# Patient Record
Sex: FEMALE | Race: NATIVE HAWAIIAN OR OTHER PACIFIC ISLANDER | Employment: FULL TIME | URBAN - METROPOLITAN AREA
[De-identification: names, ages, dates, MRNs, and addresses within clinical notes are randomized per-mention and may not be internally consistent; named-entity substitution may affect disease eponyms.]

---

## 2017-04-19 ENCOUNTER — HOSPITAL ENCOUNTER (OUTPATIENT)
Dept: ULTRASOUND IMAGING | Age: 57
Discharge: HOME | End: 2017-04-19
Attending: HEALTH CARE PROVIDER
Payer: OTHER GOVERNMENT

## 2017-04-19 DIAGNOSIS — D25.0 SUBMUCOUS LEIOMYOMA OF UTERUS: ICD-10-CM

## 2017-04-19 PROCEDURE — 76830 TRANSVAGINAL US NON-OB: CPT

## 2017-08-01 ENCOUNTER — HOSPITAL ENCOUNTER (OUTPATIENT)
Dept: PHYSICAL THERAPY | Age: 57
Discharge: HOME | End: 2017-08-01
Payer: OTHER GOVERNMENT

## 2017-08-01 PROCEDURE — 97112 NEUROMUSCULAR REEDUCATION: CPT

## 2017-08-01 PROCEDURE — 97162 PT EVAL MOD COMPLEX 30 MIN: CPT

## 2017-08-01 PROCEDURE — 97140 MANUAL THERAPY 1/> REGIONS: CPT

## 2017-08-01 NOTE — PROGRESS NOTES
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 \"    SUBJECTIVE  Chief Complaint:    Mechanism of injury:    Symptoms:  Pain rating (0-10): Today:    Best:    Worst:    [] Contstant:    [] Intermittent:     Aggravated by:   [] Bending [] Sitting [] Standing [] Walking   [] Moving [] Cough [] Sneeze [] Valsalva   [] AM  [] PM  Lying:  [] sup   [] pro   [] sidelying   [] Other:     Eased by:    [] Bending [] Sitting [] Standing [] Walking   [] Moving [] AM  [] PM  Lying: [] sup  [] pro  [] sidelying   [] Other:     General Health:  Red Flags Indicated? [] Yes    [] No  [] Yes [] No Recent weight change (If yes, due to dieting?  [] Yes  [] No)   [] Yes [] No Weakness in legs during walking  [] Yes [] No Unremitting pain at night  [] Yes [] No Abdominal pain or problems  [] Yes [] No Rectal bleeding  [] Yes [] No Feet more cold or painful in cold weather  [] Yes [] No Menstrual irregularities  [] Yes [] No Blood or pain with urination  [] Yes [] No Dysfunction of bowel or bladder  [] Yes [] No Recent illness within past 3 weeks (i.e, cold, flu)  [] Yes [] No Numbness/tingling in buttock/genitalia region    Past History/Treatments:     Diagnostic Tests: [] Lab work [] X-rays    [] CT [] MRI     [] Other:  Results:    Functional Status  Prior level of function:  Present functional limitations:  What position do you sleep in?:    OBJECTIVE  Posture:  Lateral Shift: [] R    [] L     [] +  [] -  Kyphosis: [] Increased [] Decreased   []  WNL  Lordosis:  [] Increased [] Decreased   [] WNL  Pelvic symmetry: [] WNL    [] Other:    Gait:  [] Normal     [] Abnormal:    Active Movements: [] N/A   [] Too acute   [] Other:  ROM % AROM % PROM Comments:pain, area   Forward flexion 40-60      Extension 20-30      SB right 20-30      SB left 20-30      Rotation right 5-10      Rotation left 5-10        Repeated Movements   Effects on present pain: produces (CO), abolishes (A), increases (incr), decreases (decr), centralizes (C), peripheral (PH), no effect (NE)   Pre-Test Sx Flexion Repeated Flexion Extension Repeated Extension Repeated SBL Repeated SBR   Sitting          Standing          Lying      N/A N/A   Comments:  Side Glide:  Sustained passive positioning test:    Neuro Screen [] WNL  Myotome/Dermatome/Reflexes:  Comments:    Dural Mobility:  SLR Sitting: [] R    [] L    [] +    [] -  @ (degrees):           Supine: [] R    [] L    [] +    [] -  @ (degrees):   Slump Test: [] R    [] L    [] +    [] -  @ (degrees):   Prone Knee Bend: [] R    [] L    [] +    [] -     Palpation  [] Min  [] Mod  [] Severe    Location:  [] Min  [] Mod  [] Severe    Location:  [] Min  [] Mod  [] Severe    Location:    Stabilization Tests  Multifidus Test  Level 1: Prone abdominal draw in (Goal 6-10mmHG):  Level 2: Supported leg load supine (needle deflection at 40mmHG): [] Yes  [] No   Level 3: Unsupported leg load supine (needle deflection at 40mmHG): [] Yes  [] No     Strength   L(0-5) R (0-5) N/T   Hip Flexion (L1,2)   []   Knee Extension (L3,4)   []   Ankle Dorsiflexion (L4)   []   Great Toe Extension (L5)   []   Ankle Plantarflexion (S1)   []   Knee Flexion (S1,2)   []   Upper Abdominals   []   Lower Abdominals   []   Paraspinals   []   Back Rotators   []   Gluteus Richard   []   Other   []     Special Tests  Lumbar:  Lumb.  Compression: [] Pos  [] Neg               Lumbar Distraction:   [] Pos  [] Neg    Quadrant:  [] Pos  [] Neg   [] Flex  [] Ext    Sacroilliac:  Gaenslen's: [] R    [] L    [] +    [] -     Compression: [] +    [] -     Gapping:  [] + [] -     Thigh Thrust: [] R    [] L    [] +    [] -     Leg Length: [] +    [] -   Position:    Crests:    ASIS:    PSIS:    Sacral Sulcus:    Mobility: Standing flex:     Sitting flex:     Supine to sit:     Prone knee bend:         Hip: Brittni Dawley:  [] R    [] L    [] +    [] -     Scour:  [] R    [] L    [] +    [] -     Piriformis: [] R    [] L    [] +    [] -          Deficits: Cynthia's: [] R    [] L    [] +    [] -     Toby Wayne: [] R    [] L    [] +    [] -     Hamstrings 90/90:    Gastrocsoleus (to neutral): Right: Left:       Global Muscular Weakness:  Abdominals:  Quadratus Lumborum:  Paraspinals: Other:    Other tests/comments:        Physical Therapy Evaluation Cervical Spine - [facility]    SUBJECTIVE  Chief Complaint:    Mechanism of injury:    Symptoms  Pain rating (0-10): Today:    Best:    Worst:    [] Contstant:    [] Intermittent:     Aggravated by:   [] Bending [] Sitting [] Standing [] Reaching Overhead   [] Moving [] Cough [] Sneeze [] Eating   [] AM  [] PM  Lying:  [] sup   [] pro   [] sidelying   [] Other:     Eased by:    [] Bending [] Sitting [] Standing Lying: [] sup  [] pro  [] sidelying   [] Moving [] AM  [] PM  [] Other:     General Health:  Red Flags Indicated? [] Yes    [] No  [] Yes [] No Recent weight change (If yes, due to dieting? [] Yes  [] No)   [] Yes [] No Persistent cough  [] Yes [] No Unremitting pain at night  [] Yes [] No Dizziness  [] Yes [] No Blurred vision  [] Yes [] No Hands more cold or painful in cold weather  [] Yes [] No Ringing in ears  [] Yes [] No Difficulty swallowing  [] Yes [] No Dysfunction of bowel or bladder  [] Yes [] No Recent illness within past 3 weeks (i.e, cold, flu)  [] Yes [] No Jaw pain    Past History/Treatments:    Diagnostic Tests: [] Lab work [] X-rays    [] CT [] MRI     [] Other:  Results:    Functional Status  Prior level of function:  Present functional limitations:  What position do you sleep in?:    Headaches: Do you have headaches?  [] Yes [] No  How often do you get headaches? How long does the headache last?  What aggravates it? What relieves it? Does the headache coincide with any other symptoms (visual disturbances, light sensitivity)? Where is the headache? Does it change locations?   Other:    OBJECTIVE  Posture: [] WNL  Head Position: mild left side bend of neck  Shoulder/Scapular Position:  C-Kyphosis:  [] increased   [] decreased   C-Lordosis:   [] increased   [] decreased  T-Kyphosis:  [] increased   [] decreased  T-Lordosis:   [] increased   [] decreased     TMJ: [] N/A [] Abnormal - ROM:   Palpation:    Cervical Retraction: [] WNL    [] Abnormal:    Shoulder/Scapular Screen: [] WNL    [] Abnormal:    Active Movements: [] N/A   [] Too acute   [] Other:  ROM % AROM % PROM Comments:pain, area   Forward flexion 45     Extension 39     SB right 30 100    SB left  44 100    Rotation right 64 100    Rotation left 66 100      Thoracic Spine: [] N/A    [] WNL   [] Other:    PROM:    Palpation:  [] Min  [] Mod  [] Severe    Location:  [] Min  [] Mod  [] Severe    Location:  [] Min  [] Mod  [] Severe    Location:    Neuro Screen (myotome/dematome/felexes): [] WNL  Myotome Level Muscle Test Myotome Level Muscle Test   C5 Shoulder Adduction - Deltoid C8 Finger Flexors   C6 Wrist Extension T1 Finger Abduction - Interossei   C7 Elbow Extension     Comments:  Upper Limb Tension Tests: [] N/A       Ulnar: [] R    [] L    [] +    [] -       Median: [] R    [] L    [] +    [] -       Radial: [] R    [] L    [] +    [] -    Special Tests:  Cervical:        Vertebral Artery:  [] R    [] L    [] +    [] -       Alar Ligament: [] R    [] L    [] +    [] -       Transverse Lig: [] R    [] L    [] +    [] -       Spurling's:  [] R    [] L    [] +    [] -       Distraction:  [] R    [] L    [] +    [] -       Compression: [] R    [] L    [] +    [] -    Thoracic Outlet Tests: [] N/A       Adson's:  [] R    [] L    [] +    [] -       Hyperabduction: [] R    [] L    [] +    [] -       Pramod's:  [] R    [] L    [] +    [] -       Cris Pollard:  [] R    [] L    [] +    [] -    Diaphragmatic Breathing: [] Normal    [] Abnormal    Muscle Flexibility: [] N/A   Scalenes: [] WNL    [x] Tight    [x] R    [x] L   Upper Trap: [] WNL    [x] Tight    [x] R    [x] L   Levator: [] WNL    [x] Tight    [x] R    [x] L   Pect. Minor: [] WNL    [] Tight    [] R    [] L    Global Muscular Weakness: [] N/A   Lower Trap:   Rhomboids:   Middle Trap:   Serratus Ant:   Ext Rotators:    Other:    Other tests/comments:  right LE shorter than left in supine and sitting- right innominate upslip  C2 segment rotated left resulting in mild left C-spine sidebend  Segmental mobility in side gliding mildly hypo mobile  Segmental mobility in sagittal palne moderate-significantly hypomobile in upper T-spine/lower C-spine segments  Trigger points B UT, levator scapula, B anterior scalenes, B SCM, B neck paraspinals and B neck multifidi  AROM shoulder flexion: 152 degrees right, 165 degrees left  Abduction: 175 degrees right with pain, 180 degrees left  Shoulder extension: 50 degrees right, 65 degrees left  Scaption: 169 degrees right, 180 degrees left  ER: C7 right, T1 left  IR: T12 right, T11 left  Positive Joleenkin-Sean and Neterrie  1+ TTP right subacromial space  0 TTP right ACJ  PROM shoulder flexion: 155 degrees right, 180 degrees left  PROM right shoulder: all other directions WNL on right

## 2017-08-01 NOTE — PROGRESS NOTES
PT DAILY TREATMENT NOTE     Patient Name: Brandy Tracy  Date:2017  : 1960  [x]  Patient  Verified  Payor: [facility] / Plan: [facility]  RETIREES AND DEPENDENTS / Product Type: [employer] /    In time:4:45  Out time:5:40  Total Treatment Time (min): 54  Visit #: 1 of 16    Treatment Area: Low back pain [M54.5]  Cervicalgia [M54.2]    SUBJECTIVE  Pain Level (0-10 scale): 8/10 back, 4-5/10 neck  Any medication changes, allergies to medications, adverse drug reactions, diagnosis change, or new procedure performed?: [x] No    [] Yes (see summary sheet for update)  Subjective functional status/changes:   [] No changes reported  LBP right- insidious onset over 10 years ago  3-4 weeks ago lifting bag of laundry and had left anterolateral neck pain  C/o right LBP and right posterolateral trunk and right posterior LE pain to the ankle when standing longer than 3 hours, left richard lateral neck, denies UE radicular symptoms, weakness B hands (OA)   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 \"    OBJECTIVE    Modality rationale:    Min Type Additional Details    [] Estim:  []Unatt       []IFC  []Premod                        []Other:  []w/ice   []w/heat  Position:  Location:    [] Estim: []Att    []TENS instruct  []NMES                    []Other:  []w/US   []w/ice   []w/heat  Position:  Location:    []  Traction: [] Cervical       []Lumbar                       [] Prone          []Supine                       []Intermittent   []Continuous Lbs:  [] before manual  [] after manual    []  Ultrasound: []Continuous   [] Pulsed                           []1MHz   []3MHz W/cm2:  Location:    []  Iontophoresis with dexamethasone         Location: [] Take home patch   [] In clinic    []  Ice     []  heat  []  Ice massage  []  Laser   []  Anodyne Position:  Location:    []  Laser with stim  []  Other:  Position:  Location:    []  Vasopneumatic Device Pressure:       [] lo [] med [] hi   Temperature: [] lo [] med [] hi   [] Skin assessment post-treatment:  []intact []redness- no adverse reaction    []redness - adverse reaction:     25 min [x]Eval                  []Re-Eval        min Therapeutic Exercise:  [] See flow sheet :        min Therapeutic Activity:  []  See flow sheet :        10 min Neuromuscular Re-education:  [x]  See flow sheet :  Added prone heel presses, glutes sets, bridges with adduction   Rationale: increase strength, improve coordination and increase proprioception  to improve the patients ability to tolerate positions and normalize ADLs. 20 min Manual Therapy:    METs to correct right innominate upslip, trigger point release B upper neck paraspinals, B SCM, B levator scapula   Rationale: decrease pain and correct joint alignment and joint mechanics to tolerate positions and ADLs. min Gait Training:  ___ feet with ___ device on level surfaces with ___ level of assist   Rationale: With   [] TE   [] TA   [] neuro   [] other: Patient Education: [x] Review HEP    [] Progressed/Changed HEP based on:   [] positioning   [] body mechanics   [] transfers   [] heat/ice application    [] other:      Other Objective/Functional Measures:   See evaluation and plan of care. Pain Level (0-10 scale) post treatment: 0/10    ASSESSMENT/Changes in Function:   Pt reports chronic constant right LBP with intermittent right LE pain to the ankle and intermittent left neck pain that started when she lifted a bag of laundry 3-4 weeks ago.   During exam, pt displayed a right innominate upslip (corrected today resulting in immediate abolishment of right LBP); AROM of the neck limited (AROM neck rotation: 66 degrees left, 64 degrees right, lateral flexion: 30 degrees right, 44 degrees, forward flexion: 45 degrees, neck extension: 39 degrees); moderate-significantly hypomobile segmental mobility in upper T-spine/lower C-spine segments in sagittal plane; decreased AROM of the right shoulder in all directions and decreased PROM shoulder flexion only (155 degrees right); positive shoulder impingement signs right shoulder; multiple trigger points in the bilateral neck and C/T musculature. Patient will continue to benefit from skilled PT services to modify and progress therapeutic interventions, address ROM deficits, address strength deficits, analyze and address soft tissue restrictions, analyze and cue movement patterns, analyze and modify body mechanics/ergonomics, assess and modify postural abnormalities and instruct in home and community integration to attain remaining goals. []  See Plan of Care  []  See progress note/recertification  []  See Discharge Summary         Progress towards goals / Updated goals:  Short Term Goals: To be accomplished in 4 weeks:    1) Goal: Pt's LBP will decrease to 4/10 at worst in order to better tolerate all ADLs. Status at initial evaluation: LBP 8/10 at worst  Current Status: not reassessed     2) Goal: Innominate alignment will remain symmetric and stable in order to decrease pain and improve tolerance for ambulation and ADLs. Status at initial evaluation: right innominate upslip corrected on 8/01/17  Current Status: not reassessed     3) Goal: Right LE radicular symptoms will be centralized to the low back in order to demonstrate effectiveness of directional preference exercises and decrease risk of LE dysfunction. Status at initial evaluation: intermittent right LE pain to the ankle  Current Status: not reassessed     4) Goal: Pt will be independent and compliant with HEP to achieve other goals. Status at initial evaluation: pt is nt independent with exercises. Current Status: not reassessed    Long Term Goals (To be completed in 8 weeks)   1) Goal: Increase AROM/PROM of right shoulder to 100% of normal in all directions without increased pain in order to return to normal function.   Status at initial evaluation:  Current status: not reassessed     2) Goal: Increase strength of right UE to 5/5 all muscle groups without increased pain in order to return to normal function. Status at initial evaluation: not assessed  Current status: not reassessed     3) Goal: Decrease pain in the neck to 2/10 at worst during ADLs in order to return to more function and improve AROM of neck in all directions. Status at initial evaluation: pain   Current status: not reassessed     4) Goal: Pt will improve AROM of neck to 100% of normal range in each direction without increased pain in order to return to safe driving. Status at initial evaluation: AROM neck rotation: 66 degrees left, 64 degrees right, lateral flexion: 30 degrees right, 44 degrees, forward flexion: 45 degrees, neck extension: 39 degrees  Current status: not reassessed     5) Goal: Abolish trigger points in the bilateral C/T and neck musculature in order to decrease pain, improve pain-free AROM of the neck in all directions, and allow pt to push, pull, and lift up to 30 lbs. without increased pain. Status at initial evaluation: multiple trigger points in the bilateral neck and C/T musculature  Current status: not reassessed     6) Goal: Abolish LBP and radicular symptoms in order to return to normal function. Status at initial evaluation: right LBP: 8/10 at worst with >r LE pain to the ankle  Current Status: not reassessed     7) Goal: Pt will be able to demonstrate ability to self manage LBP to 1/10 at worst during all activities in order to return to normal function.   Status at initial evaluation: LBP 8/10 at worst  Current Status: not reassessed     PLAN  []  Upgrade activities as tolerated     []  Continue plan of care  []  Update interventions per flow sheet       []  Discharge due to:_  [x]  Other: METs, back/core/innominate stabilization, shoulder strengthening/stabilization, shoulder ROM/stretches, trigger point release, traction, spine mobilizations      Rakan Heaton V, PT 8/1/2017  5:07 PM    No future appointments.

## 2017-08-01 NOTE — PROGRESS NOTES
[facility] in [address] [place], [address] [place] [address]  Phone: [phone] Fax: [phone] of Care/ Statement of Necessity for Physical Therapy Services    Patient name: Brittany Rankin Start of Care: 2017   Referral source: Domonique Christine MD : 1960    Medical Diagnosis: Low back pain [M54.5]  Cervicalgia [M54.2] Onset Date: over 10 years ago (LBP), early 2017 (neck)    Treatment Diagnosis: decreased AROM, pelvis obliquity/instability, muscular dysfunction, decreased strength/stability, decreased strength, shoulder impingement   Prior Hospitalization: see medical history Provider#: [id number]   Medications: Verified on Patient summary List    Comorbidities: OA, HTN   Prior Level of Function: no deficits working in [employer] and following information is based on the information from the initial evaluation. Assessment/ key information:    Pt reports chronic constant right LBP with intermittent right LE pain to the ankle and intermittent left neck pain that started when she lifted a bag of laundry 3-4 weeks ago. During exam, pt displayed a right innominate upslip (corrected today resulting in immediate abolishment of right LBP); AROM of the neck limited (AROM neck rotation: 66 degrees left, 64 degrees right, lateral flexion: 30 degrees right, 44 degrees, forward flexion: 45 degrees, neck extension: 39 degrees); moderate-significantly hypomobile segmental mobility in upper T-spine/lower C-spine segments in sagittal plane; decreased AROM of the right shoulder in all directions and decreased PROM shoulder flexion only (155 degrees right); positive shoulder impingement signs right shoulder; multiple trigger points in the bilateral neck and C/T musculature.     Evaluation Complexity History MEDIUM  Complexity : 1-2 comorbidities / personal factors will impact the outcome/ POC ; Examination HIGH Complexity : 4+ Standardized tests and measures addressing body structure, function, activity limitation and / or participation in recreation  ;Presentation LOW Complexity : Stable, uncomplicated  ;Clinical Decision Making MEDIUM Complexity : FOTO score of 26-74  Overall Complexity Rating: MEDIUM  Problem List: pain affecting function, decrease ROM, decrease strength, decrease ADL/ functional abilitiies, decrease activity tolerance and decrease flexibility/ joint mobility   Treatment Plan may include any combination of the following: Therapeutic exercise, Therapeutic activities, Neuromuscular re-education, Physical agent/modality, Manual therapy and Patient education  Patient / Family readiness to learn indicated by: asking questions, trying to perform skills and interest  Persons(s) to be included in education: patient (P)  Barriers to Learning/Limitations: None  Patient Goal (s): \"to get rid of the pain. \"  Patient Self Reported Health Status: good  Rehabilitation Potential: good    Short Term Goals: To be accomplished in 4 weeks:    1) Goal: Pt's LBP will decrease to 4/10 at worst in order to better tolerate all ADLs. Status at initial evaluation: LBP 8/10 at worst   2) Goal: Innominate alignment will remain symmetric and stable in order to decrease pain and improve tolerance for ambulation and ADLs. Status at initial evaluation: right innominate upslip corrected on 8/01/17   3) Goal: Right LE radicular symptoms will be centralized to the low back in order to demonstrate effectiveness of directional preference exercises and decrease risk of LE dysfunction. Status at initial evaluation: intermittent right LE pain to the ankle   4) Goal: Pt will be independent and compliant with HEP to achieve other goals. Status at initial evaluation: pt is nt independent with exercises.     Long Term Goals (To be completed in 8 weeks)   1) Goal: Increase AROM/PROM of right shoulder to 100% of normal in all directions without increased pain in order to return to normal function. Status at initial evaluation:   2) Goal: Increase strength of right UE to 5/5 all muscle groups without increased pain in order to return to normal function. Status at initial evaluation: not assessed   3) Goal: Decrease pain in the neck to 2/10 at worst during ADLs in order to return to more function and improve AROM of neck in all directions. Status at initial evaluation: pain    4) Goal: Pt will improve AROM of neck to 100% of normal range in each direction without increased pain in order to return to safe driving. Status at initial evaluation: AROM neck rotation: 66 degrees left, 64 degrees right, lateral flexion: 30 degrees right, 44 degrees, forward flexion: 45 degrees, neck extension: 39 degrees   5) Goal: Abolish trigger points in the bilateral C/T and neck musculature in order to decrease pain, improve pain-free AROM of the neck in all directions, and allow pt to push, pull, and lift up to 30 lbs. without increased pain. Status at initial evaluation: multiple trigger points in the bilateral neck and C/T musculature   6) Goal: Abolish LBP and radicular symptoms in order to return to normal function. Status at initial evaluation: right LBP: 8/10 at worst with >r LE pain to the ankle   7) Goal: Pt will be able to demonstrate ability to self manage LBP to 1/10 at worst during all activities in order to return to normal function. Status at initial evaluation: LBP 8/10 at worst    Frequency / Duration: Patient to be seen 2 times per week for 8 weeks. Patient/ Caregiver education and instruction: Diagnosis, prognosis, self care, activity modification and exercises, plan of care   [x]  Plan of care has been reviewed with NEHA Reid, PT 8/1/2017 5:07 PM    ________________________________________________________________________    I certify that the above Therapy Services are being furnished while the patient is under my care.  I agree with the treatment plan and certify that this therapy is necessary. [de-identified] Signature:____________________  Date:____________Time: _________    Please sign and return to [facility] at [facility].  Merlyn Dubois[address]  Phone: [phone] Fax: [phone]

## 2017-08-03 ENCOUNTER — HOSPITAL ENCOUNTER (OUTPATIENT)
Dept: PHYSICAL THERAPY | Age: 57
Discharge: HOME | End: 2017-08-03
Payer: OTHER GOVERNMENT

## 2017-08-03 PROCEDURE — 97530 THERAPEUTIC ACTIVITIES: CPT

## 2017-08-03 PROCEDURE — 97140 MANUAL THERAPY 1/> REGIONS: CPT

## 2017-08-03 NOTE — PROGRESS NOTES
PT DAILY TREATMENT NOTE     Patient Name: Brandy Tracy  Date:8/3/2017  : 1960  [x]  Patient  Verified  Payor: [facility] / Plan: [facility]  RETIREES AND DEPENDENTS / Product Type: Gita Setters /    In time:1:05  Out time:1:52  Total Treatment Time (min): 47  Visit #: 2 of 16    Treatment Area: Low back pain [M54.5]  Cervicalgia [M54.2]    SUBJECTIVE  Pain Level (0-10 scale): 6/10 back, 3/10 neck  Any medication changes, allergies to medications, adverse drug reactions, diagnosis change, or new procedure performed?: [x] No    [] Yes (see summary sheet for update)  Subjective functional status/changes:   [] No changes reported  \"My back felt good until I went back to work the next day. \"    OBJECTIVE    Modality rationale: decrease pain to improve the patients ability to tolerate positions and ADLs.    Min Type Additional Details    [] Estim:  []Unatt       []IFC  []Premod                        []Other:  []w/ice   []w/heat  Position:  Location:    [] Estim: []Att    []TENS instruct  []NMES                    []Other:  []w/US   []w/ice   []w/heat  Position:  Location:    []  Traction: [] Cervical       []Lumbar                       [] Prone          []Supine                       []Intermittent   []Continuous Lbs:  [] before manual  [] after manual    []  Ultrasound: []Continuous   [] Pulsed                           []1MHz   []3MHz W/cm2:  Location:    []  Iontophoresis with dexamethasone         Location: [] Take home patch   [] In clinic   10 []  Ice     [x]  heat  []  Ice massage  []  Laser   []  Anodyne Position: supine  Location: applied to neck    []  Laser with stim  []  Other:  Position:  Location:    []  Vasopneumatic Device Pressure:       [] lo [] med [] hi   Temperature: [] lo [] med [] hi   [] Skin assessment post-treatment:  []intact []redness- no adverse reaction    []redness - adverse reaction:      min []Eval                  []Re-Eval        min Therapeutic Exercise:  [] See flow sheet :       20 min Therapeutic Activity:  []  See flow sheet :  Mechanical spine assessment   Rationale: decrease pain and decrease radiculopathy  to improve the patients ability to tolerate positions and normalize ADLs. min Neuromuscular Re-education:  []  See flow sheet :       17 min Manual Therapy:    Trigger point release B UT, B levator scapula, B neck paraspinals, left anterior scalene   Rationale: decrease pain, increase ROM, increase tissue extensibility and decrease trigger points to turn head and tolerate positions. min Gait Training:  ___ feet with ___ device on level surfaces with ___ level of assist   Rationale: With   [] TE   [] TA   [] neuro   [] other: Patient Education: [x] Review HEP    [] Progressed/Changed HEP based on:   [] positioning   [] body mechanics   [] transfers   [] heat/ice application    [] other:      Other Objective/Functional Measures:  Innominates aligned   Sittin/10 right LBP  Standing : NE (5/10)  NATALIO x10 reps: decreased LBP (3/10), but during movement right LBP increased, NW  Prone x1 minute: centralized right LBP to midline, no change in pain (4/10)  RAMON x1 minute: decreased LBP (1/10), pressure right L-spine  REIL x10 reps: abolished LBP, pressure right L-spine  right roadkill position: abolished pressure right L-spine    Pain Level (0-10 scale) post treatment: 0/10 neck, 2/10 right LBP    ASSESSMENT/Changes in Function:    Pt displays an extension bias directional preference abolishing LBP today using prone press ups. Left anterior scalene trigger point abolished today.     Patient will continue to benefit from skilled PT services to modify and progress therapeutic interventions, address ROM deficits, address strength deficits, analyze and address soft tissue restrictions, analyze and cue movement patterns, analyze and modify body mechanics/ergonomics, assess and modify postural abnormalities and instruct in home and community integration to attain remaining goals. []  See Plan of Care  []  See progress note/recertification  []  See Discharge Summary         Progress towards goals / Updated goals:  Short Term Goals: To be accomplished in 4 weeks:                         1) Goal: Pt's LBP will decrease to 4/10 at worst in order to better tolerate all ADLs. Status at initial evaluation: LBP 8/10 at worst  Current Status: not reassessed                           2) Goal: Innominate alignment will remain symmetric and stable in order to decrease pain and improve tolerance for ambulation and ADLs. Status at initial evaluation: right innominate upslip corrected on 8/01/17  Current Status: met 8/03/17                           3) Goal: Right LE radicular symptoms will be centralized to the low back in order to demonstrate effectiveness of directional preference exercises and decrease risk of LE dysfunction. Status at initial evaluation: intermittent right LE pain to the ankle  Current Status: not reassessed                           4) Goal: Pt will be independent and compliant with HEP to achieve other goals. Status at initial evaluation: pt is not independent with exercises. Current Status: not reassessed     Long Term Goals (To be completed in 8 weeks)                        1) Goal: Increase AROM/PROM of right shoulder to 100% of normal in all directions without increased pain in order to return to normal function. Status at initial evaluation:  Current status: not reassessed                           2) Goal: Increase strength of right UE to 5/5 all muscle groups without increased pain in order to return to normal function. Status at initial evaluation: not assessed  Current status: not reassessed                           3) Goal: Decrease pain in the neck to 2/10 at worst during ADLs in order to return to more function and improve AROM of neck in all directions.   Status at initial evaluation: pain   Current status: not reassessed    4) Goal: Pt will improve AROM of neck to 100% of normal range in each direction without increased pain in order to return to safe driving. Status at initial evaluation: AROM neck rotation: 66 degrees left, 64 degrees right, lateral flexion: 30 degrees right, 44 degrees, forward flexion: 45 degrees, neck extension: 39 degrees  Current status: not reassessed                           5) Goal: Abolish trigger points in the bilateral C/T and neck musculature in order to decrease pain, improve pain-free AROM of the neck in all directions, and allow pt to push, pull, and lift up to 30 lbs. without increased pain. Status at initial evaluation: multiple trigger points in the bilateral neck and C/T musculature  Current status: progressing 8/03/17                           6) Goal: Abolish LBP and radicular symptoms in order to return to normal function. Status at initial evaluation: right LBP: 8/10 at worst with >r LE pain to the ankle  Current Status: not reassessed                           7) Goal: Pt will be able to demonstrate ability to self manage LBP to 1/10 at worst during all activities in order to return to normal function.   Status at initial evaluation: LBP 8/10 at worst  Current Status: not reassessed    PLAN  []  Upgrade activities as tolerated     [x]  Continue plan of care  []  Update interventions per flow sheet       []  Discharge due to:_  []  Other:_      Doe Garcia PT 8/3/2017  1:16 PM    Future Appointments  Date Time Provider Wallace Francis   8/8/2017 3:30 PM CARL Maher [facility]   8/10/2017 11:00 AM CARL Maher THE [facility]

## 2017-08-08 ENCOUNTER — HOSPITAL ENCOUNTER (OUTPATIENT)
Dept: PHYSICAL THERAPY | Age: 57
Discharge: HOME | End: 2017-08-08
Payer: OTHER GOVERNMENT

## 2017-08-08 PROCEDURE — 97110 THERAPEUTIC EXERCISES: CPT

## 2017-08-08 PROCEDURE — 97112 NEUROMUSCULAR REEDUCATION: CPT

## 2017-08-08 NOTE — PROGRESS NOTES
PT DAILY TREATMENT NOTE     Patient Name: Summer Farris  Date:2017  : 1960  [x]  Patient  Verified  Payor:  / Plan: [facility]  RETIREES AND DEPENDENTS / Product Type: Jordon Valdez /    In time:3:30  Out time:4:17  Total Treatment Time (min): 52  Visit #: 3 of 16    Treatment Area: Low back pain [M54.5]  Cervicalgia [M54.2]    SUBJECTIVE  Pain Level (0-10 scale): 4/10 back, 1-2/10 neck  Any medication changes, allergies to medications, adverse drug reactions, diagnosis change, or new procedure performed?: [x] No    [] Yes (see summary sheet for update)  Subjective functional status/changes:   [x] No changes reported    OBJECTIVE    Modality rationale:    Min Type Additional Details    [] Estim:  []Unatt       []IFC  []Premod                        []Other:  []w/ice   []w/heat  Position:  Location:    [] Estim: []Att    []TENS instruct  []NMES                    []Other:  []w/US   []w/ice   []w/heat  Position:  Location:    []  Traction: [] Cervical       []Lumbar                       [] Prone          []Supine                       []Intermittent   []Continuous Lbs:  [] before manual  [] after manual    []  Ultrasound: []Continuous   [] Pulsed                           []1MHz   []3MHz W/cm2:  Location:    []  Iontophoresis with dexamethasone         Location: [] Take home patch   [] In clinic    []  Ice     []  heat  []  Ice massage  []  Laser   []  Anodyne Position:  Location:    []  Laser with stim  []  Other:  Position:  Location:    []  Vasopneumatic Device Pressure:       [] lo [] med [] hi   Temperature: [] lo [] med [] hi   [] Skin assessment post-treatment:  []intact []redness- no adverse reaction    []redness - adverse reaction:      min []Eval                  []Re-Eval       24 min Therapeutic Exercise:  [x] See flow sheet :  Added UBE (level 1), added shoulder flexion stretches at counter, added scapular depressions using green theraband, added rows using green theraband Rationale: increase ROM, increase strength, improve coordination and increase proprioception to improve the patients ability to normalize ADLs,     min Therapeutic Activity:  []  See flow sheet :        23 min Neuromuscular Re-education:  []  See flow sheet :  Added transverse abdominus (TA) bracing with Swissball #1, #2, #3   Rationale: increase strength, improve coordination and increase proprioception  to improve the patients ability to tolerate positions and ADLs. min Manual Therapy:          min Gait Training:  ___ feet with ___ device on level surfaces with ___ level of assist   Rationale: With   [] TE   [] TA   [] neuro   [] other: Patient Education: [x] Review HEP    [] Progressed/Changed HEP based on:   [] positioning   [] body mechanics   [] transfers   [] heat/ice application    [] other:      Other Objective/Functional Measures:   Innominates aligned. Pain Level (0-10 scale) post treatment: 0/10 neck, 1/10 back    ASSESSMENT/Changes in Function:    Pt reports that work duties aggravate pain, but exercises help relieve pain. Innominates remaining aligned and stable. Patient will continue to benefit from skilled PT services to modify and progress therapeutic interventions, address ROM deficits, address strength deficits, analyze and address soft tissue restrictions, analyze and cue movement patterns, analyze and modify body mechanics/ergonomics, assess and modify postural abnormalities and instruct in home and community integration to attain remaining goals.      []  See Plan of Care  []  See progress note/recertification  []  See Discharge Summary      Progress towards goals / Updated goals:  Short Term Goals: To be accomplished in 4 weeks:                         4) Goal: Pt's LBP will decrease to 4/10 at worst in order to better tolerate all ADLs.   Status at initial evaluation: LBP 8/10 at worst  Current Status: not reassessed                            2) Goal: Innominate alignment will remain symmetric and stable in order to decrease pain and improve tolerance for ambulation and ADLs. Status at initial evaluation: right innominate upslip corrected on 8/01/17  Current Status: met (right innominate upslip last corrected on 8/01/17) 8/08/17                            3) Goal: Right LE radicular symptoms will be centralized to the low back in order to demonstrate effectiveness of directional preference exercises and decrease risk of LE dysfunction. Status at initial evaluation: intermittent right LE pain to the ankle  Current Status: not reassessed                            9) Goal: Pt will be independent and compliant with HEP to achieve other goals. Status at initial evaluation: pt is not independent with exercises. Current Status: not reassessed      Long Term Goals (To be completed in 8 weeks)                        1) Goal: Increase AROM/PROM of right shoulder to 100% of normal in all directions without increased pain in order to return to normal function. Status at initial evaluation:  Current status: not reassessed                            2) Goal: Increase strength of right UE to 5/5 all muscle groups without increased pain in order to return to normal function. Status at initial evaluation: not assessed  Current status: not reassessed                            3) Goal: Decrease pain in the neck to 2/10 at worst during ADLs in order to return to more function and improve AROM of neck in all directions. Status at initial evaluation: pain   Current status: not reassessed                            4) Goal: Pt will improve AROM of neck to 100% of normal range in each direction without increased pain in order to return to safe driving.   Status at initial evaluation: AROM neck rotation: 66 degrees left, 64 degrees right, lateral flexion: 30 degrees right, 44 degrees, forward flexion: 45 degrees, neck extension: 39 degrees  Current status: not reassessed                            5) Goal: Abolish trigger points in the bilateral C/T and neck musculature in order to decrease pain, improve pain-free AROM of the neck in all directions, and allow pt to push, pull, and lift up to 30 lbs. without increased pain. Status at initial evaluation: multiple trigger points in the bilateral neck and C/T musculature  Current status: progressing 8/03/17                            6) Goal: Abolish LBP and radicular symptoms in order to return to normal function. Status at initial evaluation: right LBP: 8/10 at worst with >r LE pain to the ankle  Current Status: not reassessed                            7) Goal: Pt will be able to demonstrate ability to self manage LBP to 1/10 at worst during all activities in order to return to normal function.   Status at initial evaluation: LBP 8/10 at worst  Current Status: not reassessed    PLAN  []  Upgrade activities as tolerated     [x]  Continue plan of care  []  Update interventions per flow sheet       []  Discharge due to:_  []  Other:_      Tena Roper PT 8/8/2017  3:36 PM    Future Appointments  Date Time Provider Wallace Francis   8/10/2017 11:00 AM Tena Roper PT [facility]

## 2017-08-10 ENCOUNTER — HOSPITAL ENCOUNTER (OUTPATIENT)
Dept: PHYSICAL THERAPY | Age: 57
Discharge: HOME | End: 2017-08-10
Payer: OTHER GOVERNMENT

## 2017-08-10 PROCEDURE — 97112 NEUROMUSCULAR REEDUCATION: CPT

## 2017-08-10 PROCEDURE — 97140 MANUAL THERAPY 1/> REGIONS: CPT

## 2017-08-10 PROCEDURE — 97110 THERAPEUTIC EXERCISES: CPT

## 2017-08-10 NOTE — PROGRESS NOTES
PT DAILY TREATMENT NOTE     Patient Name: Erik De León  Date:8/10/2017  : 1960  [x]  Patient  Verified  Payor:  / Plan: [facility]  RETIREES AND DEPENDENTS / Product Type:  /    In time:105  Out time:155  Total Treatment Time (min): 50  Visit #: 4 of 16    Treatment Area: Low back pain [M54.5]  Cervicalgia [M54.2]    SUBJECTIVE  Pain Level (0-10 scale): 5-6/10 back, 3/10 neck  Any medication changes, allergies to medications, adverse drug reactions, diagnosis change, or new procedure performed?: [x] No    [] Yes (see summary sheet for update)  Subjective functional status/changes:   [x] No changes reported  Some tension in left neck from sleeping wrong.  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    OBJECTIVE    Modality rationale:    Min Type Additional Details    [] Estim:  []Unatt       []IFC  []Premod                        []Other:  []w/ice   []w/heat  Position:  Location:    [] Estim: []Att    []TENS instruct  []NMES                    []Other:  []w/US   []w/ice   []w/heat  Position:  Location:    []  Traction: [] Cervical       []Lumbar                       [] Prone          []Supine                       []Intermittent   []Continuous Lbs:  [] before manual  [] after manual    []  Ultrasound: []Continuous   [] Pulsed                           []1MHz   []3MHz W/cm2:  Location:    []  Iontophoresis with dexamethasone         Location: [] Take home patch   [] In clinic   10 []  Ice     [x]  heat  []  Ice massage  []  Laser   []  Anodyne Position:90/90 supine  Location:CS and LS    []  Laser with stim  []  Other:  Position:  Location:    []  Vasopneumatic Device Pressure:       [] lo [] med [] hi   Temperature: [] lo [] med [] hi   [] Skin assessment post-treatment:  []intact []redness- no adverse reaction    []redness - adverse reaction:      min []Eval                  []Re-Eval       15 min Therapeutic Exercise:  [x] See flow sheet : Rationale: increase ROM, increase strength, improve coordination and increase proprioception to improve the patients ability to normalize ADLs,     min Therapeutic Activity:  []  See flow sheet :        15 min Neuromuscular Re-education:  []  See flow sheet :  CA for proper trunk positioning to reduce LS and CS pain, improved muscle balance and breathing pattern   Rationale: increase strength, improve coordination and increase proprioception  to improve the patients ability to tolerate positions and ADLs. 10 min Manual Therapy: functional massage and facet gliding CS in supine    Rationale: increased ease with functional CS mobility/ADL     min Gait Training:  ___ feet with ___ device on level surfaces with ___ level of assist   Rationale:           With   [] TE   [] TA   [] neuro   [] other: Patient Education: [x] Review HEP    [] Progressed/Changed HEP based on:   [] positioning   [] body mechanics   [] transfers   [] heat/ice application    [] other:      Other Objective/Functional Measures:   Limited shoulder Flex right >left  HGIR both slightly restricted 60 deg  Difficulty with proper diaphragmatic breathing pattern, needs constant verbal cues  Shallow breathing      Pain Level (0-10 scale) post treatment: 0/10     ASSESSMENT/Changes in Function:   Responding well to treatment    Patient will continue to benefit from skilled PT services to modify and progress therapeutic interventions, address ROM deficits, address strength deficits, analyze and address soft tissue restrictions, analyze and cue movement patterns, analyze and modify body mechanics/ergonomics, assess and modify postural abnormalities and instruct in home and community integration to attain remaining goals.      [x]  See Plan of Care  []  See progress note/recertification  []  See Discharge Summary      Progress towards goals / Updated goals:  Short Term Goals: To be accomplished in 4 weeks:                         7) Goal: Pt's LBP will decrease to 4/10 at worst in order to better tolerate all ADLs. Status at initial evaluation: LBP 8/10 at worst  Current Status: LB 0-6/10, overall pain reduction, progressing                            8) Goal: Innominate alignment will remain symmetric and stable in order to decrease pain and improve tolerance for ambulation and ADLs. Status at initial evaluation: right innominate upslip corrected on 8/01/17  Current Status: met (right innominate upslip last corrected on 8/01/17) 8/08/17                            3) Goal: Right LE radicular symptoms will be centralized to the low back in order to demonstrate effectiveness of directional preference exercises and decrease risk of LE dysfunction. Status at initial evaluation: intermittent right LE pain to the ankle  Current Status: not reassessed                            8) Goal: Pt will be independent and compliant with HEP to achieve other goals. Status at initial evaluation: pt is not independent with exercises. Current Status: not reassessed      Long Term Goals (To be completed in 8 weeks)                        1) Goal: Increase AROM/PROM of right shoulder to 100% of normal in all directions without increased pain in order to return to normal function. Status at initial evaluation:  Current status: not reassessed                            2) Goal: Increase strength of right UE to 5/5 all muscle groups without increased pain in order to return to normal function. Status at initial evaluation: not assessed  Current status: not reassessed                            3) Goal: Decrease pain in the neck to 2/10 at worst during ADLs in order to return to more function and improve AROM of neck in all directions. Status at initial evaluation: pain   Current status: not reassessed                            4) Goal: Pt will improve AROM of neck to 100% of normal range in each direction without increased pain in order to return to safe driving.   Status at initial evaluation: AROM neck rotation: 66 degrees left, 64 degrees right, lateral flexion: 30 degrees right, 44 degrees, forward flexion: 45 degrees, neck extension: 39 degrees  Current status: not reassessed                            5) Goal: Abolish trigger points in the bilateral C/T and neck musculature in order to decrease pain, improve pain-free AROM of the neck in all directions, and allow pt to push, pull, and lift up to 30 lbs. without increased pain. Status at initial evaluation: multiple trigger points in the bilateral neck and C/T musculature  Current status: progressing 8/03/17                            6) Goal: Abolish LBP and radicular symptoms in order to return to normal function. Status at initial evaluation: right LBP: 8/10 at worst with >r LE pain to the ankle  Current Status: not reassessed                            7) Goal: Pt will be able to demonstrate ability to self manage LBP to 1/10 at worst during all activities in order to return to normal function.   Status at initial evaluation: LBP 8/10 at worst  Current Status: not reassessed    PLAN  []  Upgrade activities as tolerated     [x]  Continue plan of care  []  Update interventions per flow sheet       []  Discharge due to:_  []  Other:_      Lina Hoang PT 8/10/2017  3:36 PM    Future Appointments  Date Time Provider Wallace Francis   8/14/2017 3:00 PM CARL Thibodeaux [facility]   8/17/2017 3:00 PM CARL Ulloa [facility]   8/21/2017 2:30 PM CARL Thibodeaux [facility]   8/23/2017 3:00 PM CARL ThibodeauxHPNATHAN [facility]   8/28/2017 3:30 PM CARL FineHPNATHAN THE [facility]   8/30/2017 3:00 PM CARL Thibodeaux THE [facility]

## 2017-08-17 ENCOUNTER — HOSPITAL ENCOUNTER (OUTPATIENT)
Dept: PHYSICAL THERAPY | Age: 57
Discharge: HOME | End: 2017-08-17
Payer: OTHER GOVERNMENT

## 2017-08-17 PROCEDURE — 97110 THERAPEUTIC EXERCISES: CPT | Performed by: PHYSICAL THERAPIST

## 2017-08-17 NOTE — PROGRESS NOTES
PT DAILY TREATMENT NOTE     Patient Name: Marley Joshi  Date:2017  : 1960  [x]  Patient  Verified  Payor:  / Plan: [facility]  RETIREES AND DEPENDENTS / Product Type: Ferne Christopher /    In time:3:10  Out time:3:55  Total Treatment Time (min): 45  Visit #: 5 of 16    Treatment Area: Low back pain [M54.5]  Cervicalgia [M54.2]    SUBJECTIVE  Pain Level (0-10 scale): LB 6/10; N 2-3/10  Any medication changes, allergies to medications, adverse drug reactions, diagnosis change, or new procedure performed?: [x] No    [] Yes (see summary sheet for update)  Subjective functional status/changes:   [] No changes reported  Feels good. No new issues. OBJECTIVE    Modality rationale: decrease pain to improve the patients ability to complete ADLs   Min Type Additional Details   10 []  Ice     [x]  heat  []  Ice massage  []  Laser   []  Anodyne Position: supine  Location: Lumbar   [x] Skin assessment post-treatment:  [x]intact [x]redness- no adverse reaction    []redness - adverse reaction:     35 min Therapeutic Exercise:  [x] See flow sheet :   Rationale: increase ROM, increase strength and decrease pain to improve the patients ability to complete ADLs            With   [] TE   [] TA   [] neuro   [] other: Patient Education: [x] Review HEP    [] Progressed/Changed HEP based on:   [] positioning   [] body mechanics   [] transfers   [] heat/ice application    [] other:         Pain Level (0-10 scale) post treatment: 0/10 N; 2/10 LB    ASSESSMENT/Changes in Function: Patient responds well to treatment session. Has difficulty understanding instructions today, seemingly due to language barrier. Progress as tolerated.  No adverse effects were noted from today's treatment session      Patient will continue to benefit from skilled PT services to modify and progress therapeutic interventions, address functional mobility deficits, address ROM deficits, address strength deficits and analyze and address soft tissue restrictions to attain remaining goals. [x]  See Plan of Care  []  See progress note/recertification  []  See Discharge Summary         Progress towards goals / Updated goals:  Short Term Goals: To be accomplished in 4 weeks:                         2) Goal: Pt's LBP will decrease to 4/10 at worst in order to better tolerate all ADLs. Status at initial evaluation: LBP 8/10 at worst  Current Status: LB 0-6/10, overall pain reduction, progressing                            9) Goal: Innominate alignment will remain symmetric and stable in order to decrease pain and improve tolerance for ambulation and ADLs. Status at initial evaluation: right innominate upslip corrected on 8/01/17  Current Status: met (right innominate upslip last corrected on 8/01/17) 8/08/17                            3) Goal: Right LE radicular symptoms will be centralized to the low back in order to demonstrate effectiveness of directional preference exercises and decrease risk of LE dysfunction. Status at initial evaluation: intermittent right LE pain to the ankle  Current Status: not reassessed                            0) Goal: Pt will be independent and compliant with HEP to achieve other goals. Status at initial evaluation: pt is not independent with exercises. Current Status: not reassessed      Long Term Goals (To be completed in 8 weeks)                        1) Goal: Increase AROM/PROM of right shoulder to 100% of normal in all directions without increased pain in order to return to normal function. Status at initial evaluation:  Current status: not reassessed                            2) Goal: Increase strength of right UE to 5/5 all muscle groups without increased pain in order to return to normal function.   Status at initial evaluation: not assessed  Current status: not reassessed                            3) Goal: Decrease pain in the neck to 2/10 at worst during ADLs in order to return to more function and improve AROM of neck in all directions. Status at initial evaluation: pain   Current status: not reassessed                            4) Goal: Pt will improve AROM of neck to 100% of normal range in each direction without increased pain in order to return to safe driving. Status at initial evaluation: AROM neck rotation: 66 degrees left, 64 degrees right, lateral flexion: 30 degrees right, 44 degrees, forward flexion: 45 degrees, neck extension: 39 degrees  Current status: not reassessed                            5) Goal: Abolish trigger points in the bilateral C/T and neck musculature in order to decrease pain, improve pain-free AROM of the neck in all directions, and allow pt to push, pull, and lift up to 30 lbs. without increased pain. Status at initial evaluation: multiple trigger points in the bilateral neck and C/T musculature  Current status: progressing 8/03/17                            6) Goal: Abolish LBP and radicular symptoms in order to return to normal function. Status at initial evaluation: right LBP: 8/10 at worst with >r LE pain to the ankle  Current Status: not reassessed                            7) Goal: Pt will be able to demonstrate ability to self manage LBP to 1/10 at worst during all activities in order to return to normal function.   Status at initial evaluation: LBP 8/10 at worst  Current Status: not reassessed    PLAN  []  Upgrade activities as tolerated     [x]  Continue plan of care  []  Update interventions per flow sheet       []  Discharge due to:_  []  Other:_      Cristofer Rosado PT, DPT 8/17/2017  3:08 PM    Future Appointments  Date Time Provider Wallace Francis   8/21/2017 2:30 PM CARL Smyth [facility]   8/23/2017 3:00 PM Jordon Finn PT [facility]   8/28/2017 3:30 PM Jordon Finn PT [facility]   8/30/2017 3:00 PM Jordon Finn, PT [facility]

## 2017-08-21 ENCOUNTER — HOSPITAL ENCOUNTER (OUTPATIENT)
Dept: PHYSICAL THERAPY | Age: 57
Discharge: HOME | End: 2017-08-21
Payer: OTHER GOVERNMENT

## 2017-08-21 PROCEDURE — 97112 NEUROMUSCULAR REEDUCATION: CPT

## 2017-08-21 PROCEDURE — 97140 MANUAL THERAPY 1/> REGIONS: CPT

## 2017-08-21 PROCEDURE — 97110 THERAPEUTIC EXERCISES: CPT

## 2017-08-21 NOTE — PROGRESS NOTES
PT DAILY TREATMENT NOTE     Patient Name: Kandi Smart  Date:2017  : 1960  [x]  Patient  Verified  Payor:  / Plan: [facility]  RETIREES AND DEPENDENTS / Product Type: Kyle Isaíasrose /    In time:2:33  Out time:3:26  Total Treatment Time (min): 53  Visit #: 6 of 16    Treatment Area: Low back pain [M54.5]  Cervicalgia [M54.2]    SUBJECTIVE  Pain Level (0-10 scale): 3-4/10 neck, 7/10 back  Any medication changes, allergies to medications, adverse drug reactions, diagnosis change, or new procedure performed?: [x] No    [] Yes (see summary sheet for update)  Subjective functional status/changes:   [] No changes reported  \"I just came from work and have t go back to work after PT. \"    OBJECTIVE    Modality rationale:    Min Type Additional Details    [] Estim:  []Unatt       []IFC  []Premod                        []Other:  []w/ice   []w/heat  Position:  Location:    [] Estim: []Att    []TENS instruct  []NMES                    []Other:  []w/US   []w/ice   []w/heat  Position:  Location:    []  Traction: [] Cervical       []Lumbar                       [] Prone          []Supine                       []Intermittent   []Continuous Lbs:  [] before manual  [] after manual    []  Ultrasound: []Continuous   [] Pulsed                           []1MHz   []3MHz W/cm2:  Location:    []  Iontophoresis with dexamethasone         Location: [] Take home patch   [] In clinic    []  Ice     []  heat  []  Ice massage  []  Laser   []  Anodyne Position:  Location:    []  Laser with stim  []  Other:  Position:  Location:    []  Vasopneumatic Device Pressure:       [] lo [] med [] hi   Temperature: [] lo [] med [] hi   [] Skin assessment post-treatment:  []intact []redness- no adverse reaction    []redness - adverse reaction:      min []Eval                  []Re-Eval       14 min Therapeutic Exercise:  [x] See flow sheet :   Rationale: increase ROM, increase strength, improve coordination and increase proprioception to improve the patients ability to normalize ADLs,     min Therapeutic Activity:  []  See flow sheet :        24 min Neuromuscular Re-education:  [x]  See flow sheet :   Rationale: increase strength, improve coordination and increase proprioception  to improve the patients ability to tolerate positions and ADLs. 15 min Manual Therapy:    Trigger point release B neck paraspinals, B UT, B levator scapula   Rationale: decrease pain and decrease trigger points to tolerate positions and ADLs. min Gait Training:  ___ feet with ___ device on level surfaces with ___ level of assist   Rationale: With   [] TE   [] TA   [] neuro   [] other: Patient Education: [x] Review HEP    [] Progressed/Changed HEP based on:   [] positioning   [] body mechanics   [] transfers   [] heat/ice application    [] other:      Other Objective/Functional Measures:   Innominates aligned. Pain Level (0-10 scale) post treatment: 0/10 neck, 2/10 back    ASSESSMENT/Changes in Function:    Innominates remaining aligned and stable. Back and neck pain aggravated by pt's activity level at work. Patient will continue to benefit from skilled PT services to modify and progress therapeutic interventions, address functional mobility deficits, address ROM deficits, address strength deficits and analyze and address soft tissue restrictions to attain remaining goals. []  See Plan of Care  []  See progress note/recertification  []  See Discharge Summary         Progress towards goals / Updated goals:  Short Term Goals: To be accomplished in 4 weeks:                         4) Goal: Pt's LBP will decrease to 4/10 at worst in order to better tolerate all ADLs.   Status at initial evaluation: LBP 8/10 at worst  Current Status: LB 0-6/10, overall pain reduction, progressing                            5) Goal: Innominate alignment will remain symmetric and stable in order to decrease pain and improve tolerance for ambulation and ADLs. Status at initial evaluation: right innominate upslip corrected on 8/01/17  Current Status: met (right innominate upslip last corrected on 8/01/17) 8/08/17                            3) Goal: Right LE radicular symptoms will be centralized to the low back in order to demonstrate effectiveness of directional preference exercises and decrease risk of LE dysfunction. Status at initial evaluation: intermittent right LE pain to the ankle  Current Status: not reassessed                            6) Goal: Pt will be independent and compliant with HEP to achieve other goals. Status at initial evaluation: pt is not independent with exercises. Current Status: not reassessed      Long Term Goals (To be completed in 8 weeks)                        1) Goal: Increase AROM/PROM of right shoulder to 100% of normal in all directions without increased pain in order to return to normal function. Status at initial evaluation:  Current status: not reassessed                            2) Goal: Increase strength of right UE to 5/5 all muscle groups without increased pain in order to return to normal function. Status at initial evaluation: not assessed  Current status: not reassessed                            3) Goal: Decrease pain in the neck to 2/10 at worst during ADLs in order to return to more function and improve AROM of neck in all directions. Status at initial evaluation: pain   Current status: not reassessed                            4) Goal: Pt will improve AROM of neck to 100% of normal range in each direction without increased pain in order to return to safe driving.   Status at initial evaluation: AROM neck rotation: 66 degrees left, 64 degrees right, lateral flexion: 30 degrees right, 44 degrees, forward flexion: 45 degrees, neck extension: 39 degrees  Current status: not reassessed                            5) Goal: Abolish trigger points in the bilateral C/T and neck musculature in order to decrease pain, improve pain-free AROM of the neck in all directions, and allow pt to push, pull, and lift up to 30 lbs. without increased pain. Status at initial evaluation: multiple trigger points in the bilateral neck and C/T musculature  Current status: progressing 8/03/17                            6) Goal: Abolish LBP and radicular symptoms in order to return to normal function. Status at initial evaluation: right LBP: 8/10 at worst with >r LE pain to the ankle  Current Status: not reassessed                            7) Goal: Pt will be able to demonstrate ability to self manage LBP to 1/10 at worst during all activities in order to return to normal function.   Status at initial evaluation: LBP 8/10 at worst  Current Status: not reassessed    PLAN  []  Upgrade activities as tolerated     []  Continue plan of care  []  Update interventions per flow sheet       []  Discharge due to:_  []  Other:_      Osei Tao PT 8/21/2017  2:38 PM    Future Appointments  Date Time Provider Wallace Francis   8/23/2017 3:00 PM CARL Joseph [facility]   8/28/2017 3:30 PM CARL Joseph [facility]   8/30/2017 3:00 PM CARL Joseph [facility]

## 2017-08-23 ENCOUNTER — HOSPITAL ENCOUNTER (OUTPATIENT)
Dept: PHYSICAL THERAPY | Age: 57
Discharge: HOME | End: 2017-08-23
Payer: OTHER GOVERNMENT

## 2017-08-23 PROCEDURE — 97530 THERAPEUTIC ACTIVITIES: CPT

## 2017-08-23 PROCEDURE — 97140 MANUAL THERAPY 1/> REGIONS: CPT

## 2017-08-23 PROCEDURE — 97110 THERAPEUTIC EXERCISES: CPT

## 2017-08-24 NOTE — PROGRESS NOTES
PT DAILY TREATMENT NOTE     Patient Name: Dong Montes De Oca  Date:2017  : 1960  [x]  Patient  Verified  Payor:  / Plan: [facility]  RETIREES AND DEPENDENTS / Product Type:  /    In time:334  Out time:424  Total Treatment Time (min): 50  Visit #: 7 of 16    Treatment Area: Low back pain [M54.5]  Cervicalgia [M54.2]    SUBJECTIVE  Pain Level (0-10 scale): 2/10 neck, 5/10 back  Any medication changes, allergies to medications, adverse drug reactions, diagnosis change, or new procedure performed?: [x] No    [] Yes (see summary sheet for update)  Subjective functional status/changes:   [] No changes reported  \"My neck is not hurting much but my back hurts with bending and lifting . \"    OBJECTIVE    Modality rationale:    Min Type Additional Details    [] Estim:  []Unatt       []IFC  []Premod                        []Other:  []w/ice   []w/heat  Position:  Location:    [] Estim: []Att    []TENS instruct  []NMES                    []Other:  []w/US   []w/ice   []w/heat  Position:  Location:    []  Traction: [] Cervical       []Lumbar                       [] Prone          []Supine                       []Intermittent   []Continuous Lbs:  [] before manual  [] after manual    []  Ultrasound: []Continuous   [] Pulsed                           []1MHz   []3MHz W/cm2:  Location:    []  Iontophoresis with dexamethasone         Location: [] Take home patch   [] In clinic   10 []  Ice     [x]  heat  []  Ice massage  []  Laser   []  Anodyne Position:90/90  Location:LB    []  Laser with stim  []  Other:  Position:  Location:    []  Vasopneumatic Device Pressure:       [] lo [] med [] hi   Temperature: [] lo [] med [] hi   [] Skin assessment post-treatment:  []intact []redness- no adverse reaction    []redness - adverse reaction:      min []Eval                  []Re-Eval       15 min Therapeutic Exercise:  [x] See flow sheet :   Rationale: increase ROM, increase strength, improve coordination and increase proprioception to improve the patients ability to normalize ADLs,    15 min Therapeutic Activity:  [x]  See flow sheet :initiated material handling with box up to 20#, instruction in proper body mechanics         min Neuromuscular Re-education:  [x]  See flow sheet :   Rationale: increase strength, improve coordination and increase proprioception  to improve the patients ability to tolerate positions and ADLs. 15 min Manual Therapy:    Functional massage and tone modulation with focus on LB and right QL   Rationale: decrease pain and decrease trigger points to tolerate positions and ADLs. min Gait Training:  ___ feet with ___ device on level surfaces with ___ level of assist   Rationale: With   [] TE   [] TA   [] neuro   [] other: Patient Education: [x] Review HEP    [] Progressed/Changed HEP based on:   [] positioning   [] body mechanics   [] transfers   [] heat/ice application    [] other:      Other Objective/Functional Measures:   TTP right QL mm  Faulty body mechanics with associated pain during box lift  No pain with proper lifting technique up to 20#     Pain Level (0-10 scale) post treatment: 0/10 neck, 0/10 back, 2/10 right side    ASSESSMENT/Changes in Function:    Reduction of pain with proper mechanics, good tolerance to all TE    Patient will continue to benefit from skilled PT services to modify and progress therapeutic interventions, address functional mobility deficits, address ROM deficits, address strength deficits and analyze and address soft tissue restrictions to attain remaining goals. [x]  See Plan of Care  []  See progress note/recertification  []  See Discharge Summary         Progress towards goals / Updated goals:  Short Term Goals: To be accomplished in 4 weeks:                         2) Goal: Pt's LBP will decrease to 4/10 at worst in order to better tolerate all ADLs.   Status at initial evaluation: LBP 8/10 at worst  Current Status: LB 0-6/10, overall pain reduction, progressing                            2) Goal: Innominate alignment will remain symmetric and stable in order to decrease pain and improve tolerance for ambulation and ADLs. Status at initial evaluation: right innominate upslip corrected on 8/01/17  Current Status: met (right innominate upslip last corrected on 8/01/17) 8/08/17                            3) Goal: Right LE radicular symptoms will be centralized to the low back in order to demonstrate effectiveness of directional preference exercises and decrease risk of LE dysfunction. Status at initial evaluation: intermittent right LE pain to the ankle  Current Status: not reassessed                            0) Goal: Pt will be independent and compliant with HEP to achieve other goals. Status at initial evaluation: pt is not independent with exercises. Current Status: not reassessed      Long Term Goals (To be completed in 8 weeks)                        1) Goal: Increase AROM/PROM of right shoulder to 100% of normal in all directions without increased pain in order to return to normal function. Status at initial evaluation:  Current status: not reassessed                            2) Goal: Increase strength of right UE to 5/5 all muscle groups without increased pain in order to return to normal function. Status at initial evaluation: not assessed  Current status: not reassessed                            3) Goal: Decrease pain in the neck to 2/10 at worst during ADLs in order to return to more function and improve AROM of neck in all directions. Status at initial evaluation: pain   Current status: not reassessed                            4) Goal: Pt will improve AROM of neck to 100% of normal range in each direction without increased pain in order to return to safe driving.   Status at initial evaluation: AROM neck rotation: 66 degrees left, 64 degrees right, lateral flexion: 30 degrees right, 44 degrees, forward flexion: 45 degrees, neck extension: 39 degrees  Current status: not reassessed                            5) Goal: Abolish trigger points in the bilateral C/T and neck musculature in order to decrease pain, improve pain-free AROM of the neck in all directions, and allow pt to push, pull, and lift up to 30 lbs. without increased pain. Status at initial evaluation: multiple trigger points in the bilateral neck and C/T musculature  Current status: progressing 8/03/17                            6) Goal: Abolish LBP and radicular symptoms in order to return to normal function. Status at initial evaluation: right LBP: 8/10 at worst with >r LE pain to the ankle  Current Status: not reassessed                            7) Goal: Pt will be able to demonstrate ability to self manage LBP to 1/10 at worst during all activities in order to return to normal function.   Status at initial evaluation: LBP 8/10 at worst  Current Status: not reassessed    PLAN  []  Upgrade activities as tolerated     []  Continue plan of care  []  Update interventions per flow sheet       []  Discharge due to:_  []  Other:_      Leonora Fenton PT 8/23/2017  2:38 PM    Future Appointments  Date Time Provider Wallace Francis   8/28/2017 3:30 PM CARL Maher [facility]   8/30/2017 3:00 PM CARL Maher [facility]

## 2017-08-28 ENCOUNTER — HOSPITAL ENCOUNTER (OUTPATIENT)
Dept: PHYSICAL THERAPY | Age: 57
Discharge: HOME | End: 2017-08-28
Payer: OTHER GOVERNMENT

## 2017-08-28 PROCEDURE — 97112 NEUROMUSCULAR REEDUCATION: CPT

## 2017-08-28 PROCEDURE — 97110 THERAPEUTIC EXERCISES: CPT

## 2017-08-28 PROCEDURE — 97140 MANUAL THERAPY 1/> REGIONS: CPT

## 2017-08-28 NOTE — PROGRESS NOTES
PT DAILY TREATMENT NOTE     Patient Name: Dong Montes De Oca  Date:2017  : 1960  [x]  Patient  Verified  Payor:  / Plan: [facility]  RETIREES AND DEPENDENTS / Product Type: Ernie Snide /    In time:3:35  Out time:4:38  Total Treatment Time (min): 63  Visit #: 8 of 16    Treatment Area: Low back pain [M54.5]  Cervicalgia [M54.2]    SUBJECTIVE  Pain Level (0-10 scale): 2/10 neck, 6/10 back  Any medication changes, allergies to medications, adverse drug reactions, diagnosis change, or new procedure performed?: [x] No    [] Yes (see summary sheet for update)  Subjective functional status/changes:   [] No changes reported  \"My back is hurting after working today. \"    OBJECTIVE    Modality rationale: decrease pain to improve the patients ability to tolerate positions and ADLs.    Min Type Additional Details    [] Estim:  []Unatt       []IFC  []Premod                        []Other:  []w/ice   []w/heat  Position:  Location:    [] Estim: []Att    []TENS instruct  []NMES                    []Other:  []w/US   []w/ice   []w/heat  Position:  Location:    []  Traction: [] Cervical       []Lumbar                       [] Prone          []Supine                       []Intermittent   []Continuous Lbs:  [] before manual  [] after manual    []  Ultrasound: []Continuous   [] Pulsed                           []1MHz   []3MHz W/cm2:  Location:    []  Iontophoresis with dexamethasone         Location: [] Take home patch   [] In clinic   10 []  Ice     [x]  heat  []  Ice massage  []  Laser   []  Anodyne Position: prone  Location: applied to neck and L-spine    []  Laser with stim  []  Other:  Position:  Location:    []  Vasopneumatic Device Pressure:       [] lo [] med [] hi   Temperature: [] lo [] med [] hi   [] Skin assessment post-treatment:  []intact []redness- no adverse reaction    []redness - adverse reaction:      min []Eval                  []Re-Eval       26 min Therapeutic Exercise:  [x] See flow sheet :   Rationale: increase ROM, increase strength, improve coordination and increase proprioception to improve the patients ability to normalize ADLs,     min Therapeutic Activity:  []  See flow sheet :        17 min Neuromuscular Re-education:  [x]  See flow sheet :   Rationale: increase strength, improve coordination and increase proprioception  to improve the patients ability to tolerate positions and ADLs. 10 min Manual Therapy:    PA overpressure applied to REIL   Rationale: decrease pain, increase ROM and decrease radiculopathy to tolerate positions and ADLs. min Gait Training:  ___ feet with ___ device on level surfaces with ___ level of assist   Rationale: With   [] TE   [] TA   [] neuro   [] other: Patient Education: [x] Review HEP    [] Progressed/Changed HEP based on:   [] positioning   [] body mechanics   [] transfers   [] heat/ice application    [] other:      Other Objective/Functional Measures:   No objective measures taken today. Pain Level (0-10 scale) post treatment: 0/10 neck and back    ASSESSMENT/Changes in Function:    Pt's LBP abolished after performing PA overpressure to L-spine while pt was performing REIL. Trigger points in the neck are over 80% resolved. Patient will continue to benefit from skilled PT services to modify and progress therapeutic interventions, address functional mobility deficits, address ROM deficits, address strength deficits and analyze and address soft tissue restrictions to attain remaining goals.      [x]  See Plan of Care  []  See progress note/recertification  []  See Discharge Summary      Progress towards goals / Updated goals:  Short Term Goals: To be accomplished in 4 weeks:                         4) Goal: Pt's LBP will decrease to 4/10 at worst in order to better tolerate all ADLs.   Status at initial evaluation: LBP 8/10 at worst  Current Status: LB 0-6/10, overall pain reduction, progressing                            4) Goal: Innominate alignment will remain symmetric and stable in order to decrease pain and improve tolerance for ambulation and ADLs. Status at initial evaluation: right innominate upslip corrected on 8/01/17  Current Status: met (right innominate upslip last corrected on 8/01/17) 8/08/17                            3) Goal: Right LE radicular symptoms will be centralized to the low back in order to demonstrate effectiveness of directional preference exercises and decrease risk of LE dysfunction. Status at initial evaluation: intermittent right LE pain to the ankle  Current Status: not reassessed                            3) Goal: Pt will be independent and compliant with HEP to achieve other goals. Status at initial evaluation: pt is not independent with exercises. Current Status: progressing (pt is about 50% independent with exercises) 8/28/17      Long Term Goals (To be completed in 8 weeks)                        1) Goal: Increase AROM/PROM of right shoulder to 100% of normal in all directions without increased pain in order to return to normal function. Status at initial evaluation:  Current status: not reassessed                            2) Goal: Increase strength of right UE to 5/5 all muscle groups without increased pain in order to return to normal function. Status at initial evaluation: not assessed  Current status: not reassessed                            3) Goal: Decrease pain in the neck to 2/10 at worst during ADLs in order to return to more function and improve AROM of neck in all directions. Status at initial evaluation: pain   Current status: not reassessed                            4) Goal: Pt will improve AROM of neck to 100% of normal range in each direction without increased pain in order to return to safe driving.   Status at initial evaluation: AROM neck rotation: 66 degrees left, 64 degrees right, lateral flexion: 30 degrees right, 44 degrees, forward flexion: 45 degrees, neck extension: 39 degrees  Current status: not reassessed                            5) Goal: Abolish trigger points in the bilateral C/T and neck musculature in order to decrease pain, improve pain-free AROM of the neck in all directions, and allow pt to push, pull, and lift up to 30 lbs. without increased pain. Status at initial evaluation: multiple trigger points in the bilateral neck and C/T musculature  Current status: progressing (trigger points in the neck are over 80% resolved) 8/28/17                            6) Goal: Abolish LBP and radicular symptoms in order to return to normal function. Status at initial evaluation: right LBP: 8/10 at worst with >r LE pain to the ankle  Current Status: not reassessed                            7) Goal: Pt will be able to demonstrate ability to self manage LBP to 1/10 at worst during all activities in order to return to normal function.   Status at initial evaluation: LBP 8/10 at worst  Current Status: not reassessed    PLAN  []  Upgrade activities as tolerated     [x]  Continue plan of care  []  Update interventions per flow sheet       []  Discharge due to:_  []  Other:_      Alberta Denver, PT 8/28/2017  3:35 PM    Future Appointments  Date Time Provider Wallace Francis   8/30/2017 3:00 PM Jesus Alberto Duke [facility]   9/5/2017 3:30 PM Jan SUÁREZ PT [facility]   9/7/2017 4:00 PM Brett Gonzales PT [facility]   9/11/2017 4:00 PM Jan SUÁREZ PT [facility]   9/13/2017 4:00 PM Trena Mendez PT [facility]   9/18/2017 4:00 PM Jan SUÁREZ PT [facility]   9/20/2017 4:00 PM Trena Mendez PT [facility]

## 2017-08-30 ENCOUNTER — HOSPITAL ENCOUNTER (OUTPATIENT)
Dept: PHYSICAL THERAPY | Age: 57
Discharge: HOME | End: 2017-08-30
Payer: OTHER GOVERNMENT

## 2017-08-30 PROCEDURE — 97530 THERAPEUTIC ACTIVITIES: CPT

## 2017-08-30 PROCEDURE — 97110 THERAPEUTIC EXERCISES: CPT

## 2017-08-30 PROCEDURE — 97140 MANUAL THERAPY 1/> REGIONS: CPT

## 2017-08-30 NOTE — PROGRESS NOTES
PT DAILY TREATMENT NOTE     Patient Name: Kalen Field  Date:2017  : 1960  [x]  Patient  Verified  Payor:  / Plan: [facility]  RETIREES AND DEPENDENTS / Product Type: [employer] /    In time:310  Out time:415  Total Treatment Time (min): 65  Visit #: 9 of 16    Treatment Area: Low back pain [M54.5]  Cervicalgia [M54.2]    SUBJECTIVE  Pain Level (0-10 scale): 2/10 neck, 4/10 back  Any medication changes, allergies to medications, adverse drug reactions, diagnosis change, or new procedure performed?: [x] No    [] Yes (see summary sheet for update)  Subjective functional status/changes:   [] No changes reported  \"Overall reduction in pain but still having back pain on right aries with lifting at work, picking up laundry\"    OBJECTIVE    Modality rationale: decrease pain to improve the patients ability to tolerate positions and ADLs.    Min Type Additional Details    [] Estim:  []Unatt       []IFC  []Premod                        []Other:  []w/ice   []w/heat  Position:  Location:    [] Estim: []Att    []TENS instruct  []NMES                    []Other:  []w/US   []w/ice   []w/heat  Position:  Location:    []  Traction: [] Cervical       []Lumbar                       [] Prone          []Supine                       []Intermittent   []Continuous Lbs:  [] before manual  [] after manual    []  Ultrasound: []Continuous   [] Pulsed                           []1MHz   []3MHz W/cm2:  Location:    []  Iontophoresis with dexamethasone         Location: [] Take home patch   [] In clinic   10 []  Ice     [x]  heat  []  Ice massage  []  Laser   []  Anodyne Position: prone  Location: applied to neck and L-spine    []  Laser with stim  []  Other:  Position:  Location:    []  Vasopneumatic Device Pressure:       [] lo [] med [] hi   Temperature: [] lo [] med [] hi   [] Skin assessment post-treatment:  []intact []redness- no adverse reaction    []redness - adverse reaction:      min []Eval []Re-Eval       30 min Therapeutic Exercise:  [x] See flow sheet :added QL ex and stretches   Rationale: increase ROM, increase strength, improve coordination and increase proprioception to improve the patients ability to normalize ADLs,    15 min Therapeutic Activity:  [x]  See flow sheet :reevaluation         min Neuromuscular Re-education:  [x]  See flow sheet :       10 min Manual Therapy:    Functional massage LB with focus on right QL and right lateral trunk in prone and left SL   Rationale: decrease pain, increase ROM and decrease radiculopathy to tolerate positions and ADLs. 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 Trigger points in the neck are over 80% resolved. Patient will continue to benefit from skilled PT services to modify and progress therapeutic interventions, address functional mobility deficits, address ROM deficits, address strength deficits and analyze and address soft tissue restrictions to attain remaining goals.      [x]  See Plan of Care  [x]  See progress note/recertification  []  See Discharge Summary      Progress towards goals / Updated Dede Lyn has been showing nice progress in pain levels, overall function (FOTO 73/100 CS, 70/100 back) and mobility.  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 I recommend to continue with current treatment to address remaining deficits. Short Term Goals: To be accomplished in 4 weeks:                         3) Goal: Pt's LBP will decrease to 4/10 at worst in order to better tolerate all ADLs. Status at initial evaluation: LBP 8/10 at worst  Current Status: LB 0-4/10, overall pain reduction, pain with material handling, goal met                            2) Goal: Innominate alignment will remain symmetric and stable in order to decrease pain and improve tolerance for ambulation and ADLs. Status at initial evaluation: right innominate upslip corrected on 8/01/17  Current Status: met (right innominate upslip last corrected on 8/01/17) 8/08/17                            3) Goal: Right LE radicular symptoms will be centralized to the low back in order to demonstrate effectiveness of directional preference exercises and decrease risk of LE dysfunction. Status at initial evaluation: intermittent right LE pain to the ankle  Current Status: occasional right LE pain with prolonged standing, residual mild calf pain with prolonged standing during work, progressing                            5) Goal: Pt will be independent and compliant with HEP to achieve other goals. Status at initial evaluation: pt is not independent with exercises. Current Status: progressing (pt is about 50% independent with exercises) 8/28/17      Long Term Goals (To be completed in 8 weeks)                        1) Goal: Increase AROM/PROM of right shoulder to 100% of normal in all directions without increased pain in order to return to normal function.   Status at initial evaluation:  Current status: functional symmetrical shoulder mobility with minimal residual right shoulder pain, goal met                            2) Goal: Increase strength of right UE to 5/5 all muscle groups without increased pain in order to return to normal function. Status at initial evaluation: not assessed  Current status: not reassessed                            3) Goal: Decrease pain in the neck to 2/10 at worst during ADLs in order to return to more function and improve AROM of neck in all directions. Status at initial evaluation: pain   Current status:2/10 pain with ADL max, goal met                            4) Goal: Pt will improve AROM of neck to 100% of normal range in each direction without increased pain in order to return to safe driving. Status at initial evaluation: AROM neck rotation: 66 degrees left, 64 degrees right, lateral flexion: 30 degrees right, 44 degrees, forward flexion: 45 degrees, neck extension: 39 degrees  Current status: functional ROM CS in all planes without pain, goal met                            5) Goal: Abolish trigger points in the bilateral C/T and neck musculature in order to decrease pain, improve pain-free AROM of the neck in all directions, and allow pt to push, pull, and lift up to 30 lbs. without increased pain. Status at initial evaluation: multiple trigger points in the bilateral neck and C/T musculature  Current status: progressing (trigger points in the neck are over 80% resolved) 8/28/17                            6) Goal: Abolish LBP and radicular symptoms in order to return to normal function. Status at initial evaluation: right LBP: 8/10 at worst with >r LE pain to the ankle  Current Status: not reassessed                            7) Goal: Pt will be able to demonstrate ability to self manage LBP to 1/10 at worst during all activities in order to return to normal function.   Status at initial evaluation: LBP 8/10 at worst  Current Status: not reassessed    PLAN  []  Upgrade activities as tolerated     [x]  Continue plan of care  []  Update interventions per flow sheet       []  Discharge due to:_  []  Other:_      Ignacio Patricio, PT 8/30/2017  3:35 PM    Future Appointments  Date Time Provider Wallace Penny   9/5/2017 3:30 PM CARL Lee [facility]   9/7/2017 4:00 PM Trena MILTON [facility]   9/11/2017 4:00 PM CARL Lee [facility]   9/13/2017 4:00 PM Trena MILTON [facility]   9/18/2017 4:00 PM CARL RayaHPNATHAN [facility]   9/20/2017 4:00 PM CARL GilHPNATHAN [facility]

## 2017-08-31 NOTE — PROGRESS NOTES
[facility] [address] [place], [address] [place]  Phone: [phone]      Fax:  [phone]    Progress Note  Patient name: Kalen Field Start of Care: 2017   Referral source: Zaida Madison MD : 1960                         Medical Diagnosis: Low back pain [M54.5]  Cervicalgia [M54.2] Onset Date: over 10 years ago (LBP), early 2017 (neck)                         Treatment Diagnosis: decreased AROM, pelvis obliquity/instability, muscular dysfunction, decreased strength/stability, decreased strength, shoulder impingement   Prior Hospitalization: see medical history Provider#: [id number]   Medications: Verified on Patient summary List    Comorbidities: OA, HTN   Prior Level of Function: no deficits working in [place]      Visits from [place] of Care:     Missed Visits: 1            Progress towards goals / Updated Samantha David has been showing nice progress in pain levels, overall function (FOTO 73/100 CS, 70/100 back) and mobility. 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 I recommend to continue with current treatment to address remaining deficits. Short Term Goals: To be accomplished in 4 weeks:                         7) Goal: Pt's LBP will decrease to 4/10 at worst in order to better tolerate all ADLs. Status at initial evaluation: LBP 8/10 at worst  Current Status: LB 0-4/10, overall pain reduction, pain with material handling, goal met                            2) Goal: Innominate alignment will remain symmetric and stable in order to decrease pain and improve tolerance for ambulation and ADLs.   Status at initial evaluation: right innominate upslip corrected on 17  Current Status: met (right innominate upslip last corrected on 17) 17                            3) Goal: Right LE radicular symptoms will be centralized to the low back in order to demonstrate effectiveness of directional preference exercises and decrease risk of LE dysfunction. Status at initial evaluation: intermittent right LE pain to the ankle  Current Status: occasional right LE pain with prolonged standing, residual mild calf pain with prolonged standing during work, progressing                            2) Goal: Pt will be independent and compliant with HEP to achieve other goals. Status at initial evaluation: pt is not independent with exercises. Current Status: progressing (pt is about 50% independent with exercises) 8/28/17      Long Term Goals (To be completed in 8 weeks)                        1) Goal: Increase AROM/PROM of right shoulder to 100% of normal in all directions without increased pain in order to return to normal function. Status at initial evaluation:  Current status: functional symmetrical shoulder mobility with minimal residual right shoulder pain, goal met                            2) Goal: Increase strength of right UE to 5/5 all muscle groups without increased pain in order to return to normal function. Status at initial evaluation: not assessed  Current status: not reassessed                            3) Goal: Decrease pain in the neck to 2/10 at worst during ADLs in order to return to more function and improve AROM of neck in all directions. Status at initial evaluation: pain   Current status:2/10 pain with ADL max, goal met                            4) Goal: Pt will improve AROM of neck to 100% of normal range in each direction without increased pain in order to return to safe driving.   Status at initial evaluation: AROM neck rotation: 66 degrees left, 64 degrees right, lateral flexion: 30 degrees right, 44 degrees, forward flexion: 45 degrees, neck extension: 39 degrees  Current status: functional ROM CS in all planes without pain, goal met                            5) Goal: Abolish trigger points in the bilateral C/T and neck musculature in order to decrease pain, improve pain-free AROM of the neck in all directions, and allow pt to push, pull, and lift up to 30 lbs. without increased pain. Status at initial evaluation: multiple trigger points in the bilateral neck and C/T musculature  Current status: progressing (trigger points in the neck are over 80% resolved) 8/28/17                            6) Goal: Abolish LBP and radicular symptoms in order to return to normal function. Status at initial evaluation: right LBP: 8/10 at worst with >r LE pain to the ankle  Current Status: not reassessed  Key Functional Changes: functional mobility with ADL    ASSESSMENT/RECOMMENDATIONS:  [x]Continue therapy per initial plan/protocol at a frequency of  2 x per week for 8 weeks as per initial POC  []Continue therapy with the following recommended changes:_____________________      _____________________________________________________________________  []Discontinue therapy progressing towards or have reached established goals  []Discontinue therapy due to lack of appreciable progress towards goals  []Discontinue therapy due to lack of attendance or compliance  []Await Physician's recommendations/decisions regarding therapy  []Other:________________________________________________________________    Thank you for this referral.   Eve Harper, PT 8/30/2017 11:27 PM  NOTE TO PHYSICIAN:  PLEASE COMPLETE THE ORDERS BELOW AND   FAX TO [facility]: [phone]  If you are unable to process this request in 24 hours please contact our office:   [phone]  []  I have read the above report and request that my patient continue as recommended. []  I have read the above report and request that my patient continue therapy with the following changes/special instructions:________________________________________  []I have read the above report and request that my patient be discharged from therapy.     Physicians signature: ______________________________Date: ______Time:______

## 2017-09-05 ENCOUNTER — HOSPITAL ENCOUNTER (OUTPATIENT)
Dept: PHYSICAL THERAPY | Age: 57
Discharge: HOME | End: 2017-09-05
Payer: OTHER GOVERNMENT

## 2017-09-05 PROCEDURE — 97112 NEUROMUSCULAR REEDUCATION: CPT

## 2017-09-05 PROCEDURE — 97110 THERAPEUTIC EXERCISES: CPT

## 2017-09-05 PROCEDURE — 97140 MANUAL THERAPY 1/> REGIONS: CPT

## 2017-09-05 NOTE — PROGRESS NOTES
PT DAILY TREATMENT NOTE     Patient Name: Eddie Gr  Date:2017  : 1960  [x]  Patient  Verified  Payor:  / Plan: [facility]  RETIREES AND DEPENDENTS / Product Type: [employer] /    In time:3:35  Out time:4:20  Total Treatment Time (min): 45  Visit #: 10 of 16    Treatment Area: Low back pain [M54.5]  Cervicalgia [M54.2]    SUBJECTIVE  Pain Level (0-10 scale): 5/10 back, 2/10 neck  Any medication changes, allergies to medications, adverse drug reactions, diagnosis change, or new procedure performed?: [x] No    [] Yes (see summary sheet for update)  Subjective functional status/changes:   [] No changes reported  \"My back hurts when I walk. \"    OBJECTIVE    Modality rationale:    Min Type Additional Details    [] Estim:  []Unatt       []IFC  []Premod                        []Other:  []w/ice   []w/heat  Position:  Location:    [] Estim: []Att    []TENS instruct  []NMES                    []Other:  []w/US   []w/ice   []w/heat  Position:  Location:    []  Traction: [] Cervical       []Lumbar                       [] Prone          []Supine                       []Intermittent   []Continuous Lbs:  [] before manual  [] after manual    []  Ultrasound: []Continuous   [] Pulsed                           []1MHz   []3MHz W/cm2:  Location:    []  Iontophoresis with dexamethasone         Location: [] Take home patch   [] In clinic    []  Ice     []  heat  []  Ice massage  []  Laser   []  Anodyne Position:  Location:    []  Laser with stim  []  Other:  Position:  Location:    []  Vasopneumatic Device Pressure:       [] lo [] med [] hi   Temperature: [] lo [] med [] hi   [] Skin assessment post-treatment:  []intact []redness- no adverse reaction    []redness - adverse reaction:      min []Eval                  []Re-Eval       23 min Therapeutic Exercise:  [x] See flow sheet :   Rationale: increase ROM, increase strength, improve coordination and increase proprioception to improve the patients ability to normalize ADLs,     min Therapeutic Activity:  []  See flow sheet :        10 min Neuromuscular Re-education:  [x]  See flow sheet :   Rationale: increase strength, improve coordination and increase proprioception  to improve the patients ability to tolerate positions and ADLs. 12 min Manual Therapy:    METs to correct posteriorly rotated right innominate (successful) and right innominate upslip (successful)   Rationale: decrease pain and correct joint alignment and joint mechanics to tolerate positions and ADLs. min Gait Training:  ___ feet with ___ device on level surfaces with ___ level of assist   Rationale: With   [] TE   [] TA   [] neuro   [] other: Patient Education: [x] Review HEP    [] Progressed/Changed HEP based on:   [] positioning   [] body mechanics   [] transfers   [] heat/ice application    [] other:      Other Objective/Functional Measures:   right LE lengthens in sitting- posteriorly rotated right innominate  right standing flexion test  right LE shorter in supine and sitting after METs to correct posteriorly rotated right innominate- right innominate upslip    Pain Level (0-10 scale) post treatment: 0/10 neck, 2/10 right LBP    ASSESSMENT/Changes in Function:    Pt displayed both posteriorly rotated right innominate and right innominate upslip (both successfully corrected using METs resulting in a decrease of LBP). Patient will continue to benefit from skilled PT services to modify and progress therapeutic interventions, address functional mobility deficits, address ROM deficits, address strength deficits and analyze and address soft tissue restrictions to attain remaining goals.      []  See Plan of Care  []  See progress note/recertification  []  See Discharge Summary         Progress towards goals / Updated goals:  Short Term Goals: To be accomplished in 4 weeks:                         7) Goal: Pt's LBP will decrease to 4/10 at worst in order to better tolerate all ADLs. Status at initial evaluation: LBP 8/10 at worst  Status at last progress note (8/30/17): LBP 0-4/10, overall pain reduction, pain with material handling, goal met                            2) Goal: Innominate alignment will remain symmetric and stable in order to decrease pain and improve tolerance for ambulation and ADLs. Status at initial evaluation: right innominate upslip corrected on 8/01/17  Status at last progress note (8/30/17): met (right innominate upslip last corrected on 8/01/17)                            3) Goal: Right LE radicular symptoms will be centralized to the low back in order to demonstrate effectiveness of directional preference exercises and decrease risk of LE dysfunction. Status at initial evaluation: intermittent right LE pain to the ankle  Status at last progress note (8/30/17): occasional right LE pain with prolonged standing, residual mild calf pain with prolonged standing during work, progressing  Current status: not reassessed                            4) Goal: Pt will be independent and compliant with HEP to achieve other goals. Status at initial evaluation: pt is not independent with exercises. Current Status: progressing (pt is about 50% independent with exercises)  Current status: not reassessed      Long Term Goals (To be completed in 8 weeks)                        1) Goal: Increase AROM/PROM of right shoulder to 100% of normal in all directions without increased pain in order to return to normal function. Status at initial evaluation:  Status at last progress note (8/30/17): functional symmetrical shoulder mobility with minimal residual right shoulder pain, goal met                            2) Goal: Increase strength of right UE to 5/5 all muscle groups without increased pain in order to return to normal function.   Status at initial evaluation: not assessed  Status at last progress note (8/30/17): not reassessed  Current status: not reassessed                            3) Goal: Decrease pain in the neck to 2/10 at worst during ADLs in order to return to more function and improve AROM of neck in all directions. Status at initial evaluation: pain   Status at last progress note (8/30/17): 2/10 pain with ADL max, goal met                            4) Goal: Pt will improve AROM of neck to 100% of normal range in each direction without increased pain in order to return to safe driving. Status at initial evaluation: AROM neck rotation: 66 degrees left, 64 degrees right, lateral flexion: 30 degrees right, 44 degrees, forward flexion: 45 degrees, neck extension: 39 degrees  Status at last progress note (8/30/17): functional ROM CS in all planes without pain, goal met                            5) Goal: Abolish trigger points in the bilateral C/T and neck musculature in order to decrease pain, improve pain-free AROM of the neck in all directions, and allow pt to push, pull, and lift up to 30 lbs. without increased pain. Status at initial evaluation: multiple trigger points in the bilateral neck and C/T musculature  Status at last progress note (8/30/17): progressing (trigger points in the neck are over 80% resolved)  Current status: not reassessed                            6) Goal: Abolish LBP and radicular symptoms in order to return to normal function.   Status at initial evaluation: right LBP: 8/10 at worst with >r LE pain to the ankle  Status at last progress note (8/30/17): not reassessed  Current status: not reassessed    PLAN  []  Upgrade activities as tolerated     [x]  Continue plan of care  []  Update interventions per flow sheet       []  Discharge due to:_  []  Other:_      Kathryn Calle, PT 9/5/2017  3:38 PM    Future Appointments  Date Time Provider Wallace Francis   9/7/2017 4:00 PM [place], [place] [facility]   9/11/2017 4:00 PM Alva SUÁREZ PT [facility]   9/13/2017 4:00 PM Trena Vega, CARL [facility]   9/18/2017 4:00 PM CARL Ragland THE [facility]   9/20/2017 4:00 PM CARL Silverio THE [facility]

## 2017-09-07 ENCOUNTER — HOSPITAL ENCOUNTER (OUTPATIENT)
Dept: PHYSICAL THERAPY | Age: 57
Discharge: HOME | End: 2017-09-07
Payer: OTHER GOVERNMENT

## 2017-09-07 PROCEDURE — 97140 MANUAL THERAPY 1/> REGIONS: CPT

## 2017-09-07 PROCEDURE — 97110 THERAPEUTIC EXERCISES: CPT

## 2017-09-07 NOTE — PROGRESS NOTES
PT DAILY TREATMENT NOTE     Patient Name: Luis Cavazos  Date:2017  : 1960  [x]  Patient  Verified  Payor:  / Plan: [facility]  RETIREES AND DEPENDENTS / Product Type: [place] /    In time:410  Out time:458  Total Treatment Time (min): 48  Visit #: 11 of 16    Treatment Area: Low back pain [M54.5]  Cervicalgia [M54.2]    SUBJECTIVE  Pain Level (0-10 scale): 5/10 back, 1-2/10 neck  Any medication changes, allergies to medications, adverse drug reactions, diagnosis change, or new procedure performed?: [x] No    [] Yes (see summary sheet for update)  Subjective functional status/changes:   [] No changes reported  \"I still have pain in my right shoulder blade and right side of my trunk. It is not that bad any more. Samantha Sunshine \"    OBJECTIVE    Modality rationale:    Min Type Additional Details    [] Estim:  []Unatt       []IFC  []Premod                        []Other:  []w/ice   []w/heat  Position:  Location:    [] Estim: []Att    []TENS instruct  []NMES                    []Other:  []w/US   []w/ice   []w/heat  Position:  Location:    []  Traction: [] Cervical       []Lumbar                       [] Prone          []Supine                       []Intermittent   []Continuous Lbs:  [] before manual  [] after manual    []  Ultrasound: []Continuous   [] Pulsed                           []1MHz   []3MHz W/cm2:  Location:    []  Iontophoresis with dexamethasone         Location: [] Take home patch   [] In clinic   10 []  Ice     [x]  heat  []  Ice massage  []  Laser   []  Anodyne Position:left SL  Location:right lateral trunk    []  Laser with stim  []  Other:  Position:  Location:    []  Vasopneumatic Device Pressure:       [] lo [] med [] hi   Temperature: [] lo [] med [] hi   [] Skin assessment post-treatment:  []intact []redness- no adverse reaction    []redness - adverse reaction:      min []Eval                  []Re-Eval       25 min Therapeutic Exercise:  [x] See flow sheet :   Rationale: increase ROM, increase strength, improve coordination and increase proprioception to improve the patients ability to normalize ADLs,     min Therapeutic Activity:  []  See flow sheet :         min Neuromuscular Re-education:  [x]  See flow sheet :   Rationale: increase strength, improve coordination and increase proprioception  to improve the patients ability to tolerate positions and ADLs. 13 min Manual Therapy:    Functional massage right QL in prone, PA mobs T/L spine in prone   Rationale: decrease pain and correct joint alignment and joint mechanics to tolerate positions and ADLs. min Gait Training:  ___ feet with ___ device on level surfaces with ___ level of assist   Rationale: With   [] TE   [] TA   [] neuro   [] other: Patient Education: [x] Review HEP    [] Progressed/Changed HEP based on:   [] positioning   [] body mechanics   [] transfers   [] heat/ice application    [] other:      Other Objective/Functional Measures:   TTP right QL, right lower rib cage and periscapular region    Pain Level (0-10 scale) post treatment: 0/10 neck, 2/10 right ribs    ASSESSMENT/Changes in Function:    Good tolerance to treatment with residual mild pain right QL , right lateral rib cage and periscapular musculature     Patient will continue to benefit from skilled PT services to modify and progress therapeutic interventions, address functional mobility deficits, address ROM deficits, address strength deficits and analyze and address soft tissue restrictions to attain remaining goals. [x]  See Plan of Care  []  See progress note/recertification  []  See Discharge Summary         Progress towards goals / Updated goals:  Short Term Goals: To be accomplished in 4 weeks:                         8) Goal: Pt's LBP will decrease to 4/10 at worst in order to better tolerate all ADLs.   Status at initial evaluation: LBP 8/10 at worst  Status at last progress note (8/30/17): LBP 0-4/10, overall pain reduction, pain with material handling, goal met                            2) Goal: Innominate alignment will remain symmetric and stable in order to decrease pain and improve tolerance for ambulation and ADLs. Status at initial evaluation: right innominate upslip corrected on 8/01/17  Status at last progress note (8/30/17): met (right innominate upslip last corrected on 8/01/17)                            3) Goal: Right LE radicular symptoms will be centralized to the low back in order to demonstrate effectiveness of directional preference exercises and decrease risk of LE dysfunction. Status at initial evaluation: intermittent right LE pain to the ankle  Status at last progress note (8/30/17): occasional right LE pain with prolonged standing, residual mild calf pain with prolonged standing during work, progressing  Current status: Negative for radicular LE symptoms, goal met                            1) Goal: Pt will be independent and compliant with HEP to achieve other goals. Status at initial evaluation: pt is not independent with exercises. Current Status: progressing (pt is about 50% independent with exercises)  Current status: patient compliant with current HEP, goal met      Long Term Goals (To be completed in 8 weeks)                        1) Goal: Increase AROM/PROM of right shoulder to 100% of normal in all directions without increased pain in order to return to normal function. Status at initial evaluation:  Status at last progress note (8/30/17): functional symmetrical shoulder mobility with minimal residual right shoulder pain, goal met                            2) Goal: Increase strength of right UE to 5/5 all muscle groups without increased pain in order to return to normal function.   Status at initial evaluation: not assessed  Status at last progress note (8/30/17): not reassessed  Current status: good functional UE strength, no c/o deficit, formal testing pending, progressing                            3) Goal: Decrease pain in the neck to 2/10 at worst during ADLs in order to return to more function and improve AROM of neck in all directions. Status at initial evaluation: pain   Status at last progress note (8/30/17): 2/10 pain with ADL max, goal met                            4) Goal: Pt will improve AROM of neck to 100% of normal range in each direction without increased pain in order to return to safe driving. Status at initial evaluation: AROM neck rotation: 66 degrees left, 64 degrees right, lateral flexion: 30 degrees right, 44 degrees, forward flexion: 45 degrees, neck extension: 39 degrees  Status at last progress note (8/30/17): functional ROM CS in all planes without pain, goal met                            5) Goal: Abolish trigger points in the bilateral C/T and neck musculature in order to decrease pain, improve pain-free AROM of the neck in all directions, and allow pt to push, pull, and lift up to 30 lbs. without increased pain. Status at initial evaluation: multiple trigger points in the bilateral neck and C/T musculature  Status at last progress note (8/30/17): progressing (trigger points in the neck are over 80% resolved)  Current status: not reassessed                            6) Goal: Abolish LBP and radicular symptoms in order to return to normal function.   Status at initial evaluation: right LBP: 8/10 at worst with >r LE pain to the ankle  Status at last progress note (8/30/17): not reassessed  Current status: Reduction in LBP 5/10 max, no radicular pain, progressing    PLAN  []  Upgrade activities as tolerated     [x]  Continue plan of care, test material handling ability nV  []  Update interventions per flow sheet       []  Discharge due to:_  []  Other:_      Alexx Jackson PT 9/7/2017  3:38 PM    Future Appointments  Date Time Provider Wallace Francis   9/11/2017 4:00 PM Rakan Heaton V PT [facility]   9/13/2017 4:00 PM Alexx Jackson PT [facility]   9/18/2017 4:00 PM CARL Sorto THE [facility]   9/20/2017 4:00 PM CARL Taylor THE [facility]

## 2017-09-11 ENCOUNTER — HOSPITAL ENCOUNTER (OUTPATIENT)
Dept: PHYSICAL THERAPY | Age: 57
Discharge: HOME | End: 2017-09-11
Payer: OTHER GOVERNMENT

## 2017-09-11 PROCEDURE — 97110 THERAPEUTIC EXERCISES: CPT

## 2017-09-11 NOTE — PROGRESS NOTES
PT DAILY TREATMENT NOTE     Patient Name: Kedar Montano  Date:2017  : 1960  [x]  Patient  Verified  Payor:  / Plan: [facility]  RETIREES AND DEPENDENTS / Product Type: [place] /    In time:4:12  Out time:5:02  Total Treatment Time (min): 50  Visit #: 12 of 16    Treatment Area: Low back pain [M54.5]  Cervicalgia [M54.2]    SUBJECTIVE  Pain Level (0-10 scale): 1/10 neck, 5/10 back  Any medication changes, allergies to medications, adverse drug reactions, diagnosis change, or new procedure performed?: [x] No    [] Yes (see summary sheet for update)  Subjective functional status/changes:   [] No changes reported  \"I feel the back pain most when I work. \"    OBJECTIVE    Modality rationale: decrease pain to improve the patients ability to tolerate positions and ADLs.    Min Type Additional Details    [] Estim:  []Unatt       []IFC  []Premod                        []Other:  []w/ice   []w/heat  Position:  Location:    [] Estim: []Att    []TENS instruct  []NMES                    []Other:  []w/US   []w/ice   []w/heat  Position:  Location:    []  Traction: [] Cervical       []Lumbar                       [] Prone          []Supine                       []Intermittent   []Continuous Lbs:  [] before manual  [] after manual    []  Ultrasound: []Continuous   [] Pulsed                           []1MHz   []3MHz W/cm2:  Location:    []  Iontophoresis with dexamethasone         Location: [] Take home patch   [] In clinic   10 []  Ice     [x]  heat  []  Ice massage  []  Laser   []  Anodyne Position: prone  Location: applied to neck and L-spine    []  Laser with stim  []  Other:  Position:  Location:    []  Vasopneumatic Device Pressure:       [] lo [] med [] hi   Temperature: [] lo [] med [] hi   [] Skin assessment post-treatment:  []intact []redness- no adverse reaction    []redness - adverse reaction:      min []Eval                  []Re-Eval       40 min Therapeutic Exercise:  [x] See flow sheet :   Rationale: increase ROM, increase strength, improve coordination and increase proprioception to improve the patients ability to normalize ADLs,     min Therapeutic Activity:  []  See flow sheet :         min Neuromuscular Re-education:  []  See flow sheet :        min Manual Therapy:          min Gait Training:  ___ feet with ___ device on level surfaces with ___ level of assist   Rationale: With   [] TE   [] TA   [] neuro   [] other: Patient Education: [x] Review HEP    [] Progressed/Changed HEP based on:   [] positioning   [] body mechanics   [] transfers   [] heat/ice application    [] other:      Other Objective/Functional Measures:   No objective measures taken today. Pain Level (0-10 scale) post treatment: 0/10 neck, 2/10 back    ASSESSMENT/Changes in Function:    Pt's neck and back pain significantly reduced after PT treatments, but duties at work aggravate LBP. Pt was instructed to perform extension bias directional preference exercises regularly throughout the day. Patient will continue to benefit from skilled PT services to modify and progress therapeutic interventions, address functional mobility deficits, address ROM deficits, address strength deficits and analyze and address soft tissue restrictions to attain remaining goals. []  See Plan of Care  []  See progress note/recertification  []  See Discharge Summary         Progress towards goals / Updated goals:  Short Term Goals: To be accomplished in 4 weeks:                         5) Goal: Pt's LBP will decrease to 4/10 at worst in order to better tolerate all ADLs. Status at initial evaluation: LBP 8/10 at worst  Status at last progress note (8/30/17): LBP 0-4/10, overall pain reduction, pain with material handling, goal met                            2) Goal: Innominate alignment will remain symmetric and stable in order to decrease pain and improve tolerance for ambulation and ADLs.   Status at initial evaluation: right innominate upslip corrected on 8/01/17  Status at last progress note (8/30/17): met (right innominate upslip last corrected on 8/01/17)                            3) Goal: Right LE radicular symptoms will be centralized to the low back in order to demonstrate effectiveness of directional preference exercises and decrease risk of LE dysfunction. Status at initial evaluation: intermittent right LE pain to the ankle  Status at last progress note (8/30/17): occasional right LE pain with prolonged standing, residual mild calf pain with prolonged standing during work, progressing  Current status: Negative for radicular LE symptoms, goal met                            3) Goal: Pt will be independent and compliant with HEP to achieve other goals. Status at initial evaluation: pt is not independent with exercises. Current Status: progressing (pt is about 50% independent with exercises)  Current status: patient compliant with current HEP, goal met      Long Term Goals (To be completed in 8 weeks)                        1) Goal: Increase AROM/PROM of right shoulder to 100% of normal in all directions without increased pain in order to return to normal function. Status at initial evaluation:  Status at last progress note (8/30/17): functional symmetrical shoulder mobility with minimal residual right shoulder pain, goal met                            2) Goal: Increase strength of right UE to 5/5 all muscle groups without increased pain in order to return to normal function. Status at initial evaluation: not assessed  Status at last progress note (8/30/17): not reassessed  Current status: good functional UE strength, no c/o deficit, formal testing pending, progressing                            3) Goal: Decrease pain in the neck to 2/10 at worst during ADLs in order to return to more function and improve AROM of neck in all directions.   Status at initial evaluation: pain   Status at last progress note (8/30/17): 2/10 pain with ADL max, goal met                            4) Goal: Pt will improve AROM of neck to 100% of normal range in each direction without increased pain in order to return to safe driving. Status at initial evaluation: AROM neck rotation: 66 degrees left, 64 degrees right, lateral flexion: 30 degrees right, 44 degrees, forward flexion: 45 degrees, neck extension: 39 degrees  Status at last progress note (8/30/17): functional ROM CS in all planes without pain, goal met                            5) Goal: Abolish trigger points in the bilateral C/T and neck musculature in order to decrease pain, improve pain-free AROM of the neck in all directions, and allow pt to push, pull, and lift up to 30 lbs. without increased pain. Status at initial evaluation: multiple trigger points in the bilateral neck and C/T musculature  Status at last progress note (8/30/17): progressing (trigger points in the neck are over 80% resolved)  Current status: not reassessed                            6) Goal: Abolish LBP and radicular symptoms in order to return to normal function.   Status at initial evaluation: right LBP: 8/10 at worst with >r LE pain to the ankle  Status at last progress note (8/30/17): not reassessed  Current status: Reduction in LBP 5/10 max, no radicular pain, progressing    PLAN  []  Upgrade activities as tolerated     [x]  Continue plan of care  []  Update interventions per flow sheet       []  Discharge due to:_  []  Other:_      Tena Roper, CARL 9/11/2017  4:39 PM    Future Appointments  Date Time Provider Wallace Francis   9/13/2017 4:00 PM Jesus Alberto Ocampo [facility]   9/18/2017 4:00 PM Vicky SUÁREZ PT [facility]   9/20/2017 4:00 PM Trena de la fuente PT [facility] THE [facility]

## 2017-09-13 ENCOUNTER — HOSPITAL ENCOUNTER (OUTPATIENT)
Dept: PHYSICAL THERAPY | Age: 57
Discharge: HOME | End: 2017-09-13
Payer: OTHER GOVERNMENT

## 2017-09-13 PROCEDURE — 97140 MANUAL THERAPY 1/> REGIONS: CPT

## 2017-09-13 PROCEDURE — 97110 THERAPEUTIC EXERCISES: CPT

## 2017-09-14 NOTE — PROGRESS NOTES
PT DAILY TREATMENT NOTE     Patient Name: Logan Morrell  Date:2017  : 1960  [x]  Patient  Verified  Payor:  / Plan: [facility]  RETIREES AND DEPENDENTS / Product Type: Rudolph Ranjit /    In time:410  Out time:455  Total Treatment Time (min): 45  Visit #: 13 of 16    Treatment Area: Low back pain [M54.5]  Cervicalgia [M54.2]    SUBJECTIVE  Pain Level (0-10 scale): 0/10 neck, 5/10 /sideback  Any medication changes, allergies to medications, adverse drug reactions, diagnosis change, or new procedure performed?: [x] No    [] Yes (see summary sheet for update)  Subjective functional status/changes:   [] No changes reported  \"I did some awkward lifting of a table that did hurt my back. No neck pain. Just right chest and LB\"    OBJECTIVE    Modality rationale: decrease pain to improve the patients ability to tolerate positions and ADLs.    Min Type Additional Details    [] Estim:  []Unatt       []IFC  []Premod                        []Other:  []w/ice   []w/heat  Position:  Location:    [] Estim: []Att    []TENS instruct  []NMES                    []Other:  []w/US   []w/ice   []w/heat  Position:  Location:    []  Traction: [] Cervical       []Lumbar                       [] Prone          []Supine                       []Intermittent   []Continuous Lbs:  [] before manual  [] after manual    []  Ultrasound: []Continuous   [] Pulsed                           []1MHz   []3MHz W/cm2:  Location:    []  Iontophoresis with dexamethasone         Location: [] Take home patch   [] In clinic   10 []  Ice     [x]  heat  []  Ice massage  []  Laser   []  Anodyne Position: prone  Location: applied to neck and L-spine    []  Laser with stim  []  Other:  Position:  Location:    []  Vasopneumatic Device Pressure:       [] lo [] med [] hi   Temperature: [] lo [] med [] hi   [] Skin assessment post-treatment:  []intact []redness- no adverse reaction    []redness - adverse reaction:      min []Eval []Re-Eval       30 min Therapeutic Exercise:  [x] See flow sheet :   Rationale: increase ROM, increase strength, improve coordination and increase proprioception to improve the patients ability to normalize ADLs,     min Therapeutic Activity:  []  See flow sheet :         min Neuromuscular Re-education:  []  See flow sheet :       15 min Manual Therapy:  Functional massage and passive stretching right lateral trunk, intercostal mm, QL        min Gait Training:  ___ feet with ___ device on level surfaces with ___ level of assist   Rationale: With   [] TE   [] TA   [] neuro   [] other: Patient Education: [x] Review HEP    [] Progressed/Changed HEP based on:   [] positioning   [] body mechanics   [] transfers   [] heat/ice application    [] other:      Other Objective/Functional Measures:   FOTO 65/100, slight regression. Pain Level (0-10 scale) post treatment: 0/10 neck, 2/10 back    ASSESSMENT/Changes in Function:        Patient will continue to benefit from skilled PT services to modify and progress therapeutic interventions, address functional mobility deficits, address ROM deficits, address strength deficits and analyze and address soft tissue restrictions to attain remaining goals. [x]  See Plan of Care  []  See progress note/recertification  []  See Discharge Summary         Progress towards goals / Updated goals:  Short Term Goals: To be accomplished in 4 weeks:                         9) Goal: Pt's LBP will decrease to 4/10 at worst in order to better tolerate all ADLs. Status at initial evaluation: LBP 8/10 at worst  Status at last progress note (8/30/17): LBP 0-4/10, overall pain reduction, pain with material handling, goal met                            2) Goal: Innominate alignment will remain symmetric and stable in order to decrease pain and improve tolerance for ambulation and ADLs.   Status at initial evaluation: right innominate upslip corrected on 8/01/17  Status at last progress note (8/30/17): met (right innominate upslip last corrected on 8/01/17)                            3) Goal: Right LE radicular symptoms will be centralized to the low back in order to demonstrate effectiveness of directional preference exercises and decrease risk of LE dysfunction. Status at initial evaluation: intermittent right LE pain to the ankle  Status at last progress note (8/30/17): occasional right LE pain with prolonged standing, residual mild calf pain with prolonged standing during work, progressing  Current status: Negative for radicular LE symptoms, goal met                            6) Goal: Pt will be independent and compliant with HEP to achieve other goals. Status at initial evaluation: pt is not independent with exercises. Current Status: progressing (pt is about 50% independent with exercises)  Current status: patient compliant with current HEP, goal met      Long Term Goals (To be completed in 8 weeks)                        1) Goal: Increase AROM/PROM of right shoulder to 100% of normal in all directions without increased pain in order to return to normal function. Status at initial evaluation:  Status at last progress note (8/30/17): functional symmetrical shoulder mobility with minimal residual right shoulder pain, goal met                            2) Goal: Increase strength of right UE to 5/5 all muscle groups without increased pain in order to return to normal function. Status at initial evaluation: not assessed  Status at last progress note (8/30/17): not reassessed  Current status: good functional UE strength, no c/o deficit, formal testing pending, progressing                            3) Goal: Decrease pain in the neck to 2/10 at worst during ADLs in order to return to more function and improve AROM of neck in all directions.   Status at initial evaluation: pain   Status at last progress note (8/30/17): 2/10 pain with ADL max, goal met                            4) Goal: Pt will improve AROM of neck to 100% of normal range in each direction without increased pain in order to return to safe driving. Status at initial evaluation: AROM neck rotation: 66 degrees left, 64 degrees right, lateral flexion: 30 degrees right, 44 degrees, forward flexion: 45 degrees, neck extension: 39 degrees  Status at last progress note (8/30/17): functional ROM CS in all planes without pain, goal met                            5) Goal: Abolish trigger points in the bilateral C/T and neck musculature in order to decrease pain, improve pain-free AROM of the neck in all directions, and allow pt to push, pull, and lift up to 30 lbs. without increased pain. Status at initial evaluation: multiple trigger points in the bilateral neck and C/T musculature  Status at last progress note (8/30/17): progressing (trigger points in the neck are over 80% resolved)  Current status: not reassessed                            6) Goal: Abolish LBP and radicular symptoms in order to return to normal function.   Status at initial evaluation: right LBP: 8/10 at worst with >r LE pain to the ankle  Status at last progress note (8/30/17): not reassessed  Current status: Reduction in LBP 5/10 max, no radicular pain, progressing    PLAN  []  Upgrade activities as tolerated     [x]  Continue plan of care  []  Update interventions per flow sheet       []  Discharge due to:_  []  Other:_      Carmen Marc PT 9/14/2017  4:39 PM    Future Appointments  Date Time Provider Wallace Francis   9/18/2017 4:00 PM CARL Henriquez [facility]   9/20/2017 4:00 PM CARL Khan [facility]

## 2017-09-18 ENCOUNTER — HOSPITAL ENCOUNTER (OUTPATIENT)
Dept: PHYSICAL THERAPY | Age: 57
Discharge: HOME | End: 2017-09-18
Payer: OTHER GOVERNMENT

## 2017-09-18 PROCEDURE — 97110 THERAPEUTIC EXERCISES: CPT

## 2017-09-18 NOTE — PROGRESS NOTES
PT DAILY TREATMENT NOTE     Patient Name: Jillene Hodgkin  Date:2017  : 1960  [x]  Patient  Verified  Payor:  / Plan: [facility]  RETIREES AND DEPENDENTS / Product Type: Quyen Nieves /    In time:4:08  Out time:4:55  Total Treatment Time (min): 47  Visit #: 14 of 16    Treatment Area: Low back pain [M54.5]  Cervicalgia [M54.2]    SUBJECTIVE  Pain Level (0-10 scale): 0/10 neck, 4/10 back  Any medication changes, allergies to medications, adverse drug reactions, diagnosis change, or new procedure performed?: [x] No    [] Yes (see summary sheet for update)  Subjective functional status/changes:   [x] No changes reported    OBJECTIVE    Modality rationale: decrease pain to improve the patients ability to tolerate positions and ADLs.    Min Type Additional Details    [] Estim:  []Unatt       []IFC  []Premod                        []Other:  []w/ice   []w/heat  Position:  Location:    [] Estim: []Att    []TENS instruct  []NMES                    []Other:  []w/US   []w/ice   []w/heat  Position:  Location:    []  Traction: [] Cervical       []Lumbar                       [] Prone          []Supine                       []Intermittent   []Continuous Lbs:  [] before manual  [] after manual    []  Ultrasound: []Continuous   [] Pulsed                           []1MHz   []3MHz W/cm2:  Location:    []  Iontophoresis with dexamethasone         Location: [] Take home patch   [] In clinic   10 []  Ice     [x]  heat  []  Ice massage  []  Laser   []  Anodyne Position: supine 90-90  Location: applied to neck and back    []  Laser with stim  []  Other:  Position:  Location:    []  Vasopneumatic Device Pressure:       [] lo [] med [] hi   Temperature: [] lo [] med [] hi   [] Skin assessment post-treatment:  []intact []redness- no adverse reaction    []redness - adverse reaction:      min []Eval                  []Re-Eval       37 min Therapeutic Exercise:  [x] See flow sheet :   Rationale: increase ROM, increase strength, improve coordination and increase proprioception to improve the patients ability to normalize ADLs,     min Therapeutic Activity:  []  See flow sheet :         min Neuromuscular Re-education:  []  See flow sheet :        min Manual Therapy:          min Gait Training:  ___ feet with ___ device on level surfaces with ___ level of assist   Rationale: With   [] TE   [] TA   [] neuro   [] other: Patient Education: [x] Review HEP    [] Progressed/Changed HEP based on:   [] positioning   [] body mechanics   [] transfers   [] heat/ice application    [] other:      Other Objective/Functional Measures:   No objective measures taken today. Pain Level (0-10 scale) post treatment: 0/10 neck, 2/10 back    ASSESSMENT/Changes in Function:    Pain remaining mild during ADLs, but pain increases as her work intensity/load increases. Patient will continue to benefit from skilled PT services to modify and progress therapeutic interventions, address functional mobility deficits, address ROM deficits, address strength deficits and analyze and address soft tissue restrictions to attain remaining goals. []  See Plan of Care  []  See progress note/recertification  []  See Discharge Summary         Progress towards goals / Updated goals:  Short Term Goals: To be accomplished in 4 weeks:                         5) Goal: Pt's LBP will decrease to 4/10 at worst in order to better tolerate all ADLs. Status at initial evaluation: LBP 8/10 at worst  Status at last progress note (8/30/17): LBP 0-4/10, overall pain reduction, pain with material handling, goal met                            2) Goal: Innominate alignment will remain symmetric and stable in order to decrease pain and improve tolerance for ambulation and ADLs.   Status at initial evaluation: right innominate upslip corrected on 8/01/17  Status at last progress note (8/30/17): met (right innominate upslip last corrected on 8/01/17)                            3) Goal: Right LE radicular symptoms will be centralized to the low back in order to demonstrate effectiveness of directional preference exercises and decrease risk of LE dysfunction. Status at initial evaluation: intermittent right LE pain to the ankle  Status at last progress note (8/30/17): occasional right LE pain with prolonged standing, residual mild calf pain with prolonged standing during work, progressing  Current status: Negative for radicular LE symptoms, goal met                            9) Goal: Pt will be independent and compliant with HEP to achieve other goals. Status at initial evaluation: pt is not independent with exercises. Current Status: progressing (pt is about 50% independent with exercises)  Current status: patient compliant with current HEP, goal met      Long Term Goals (To be completed in 8 weeks)                        1) Goal: Increase AROM/PROM of right shoulder to 100% of normal in all directions without increased pain in order to return to normal function. Status at initial evaluation:  Status at last progress note (8/30/17): functional symmetrical shoulder mobility with minimal residual right shoulder pain, goal met                            2) Goal: Increase strength of right UE to 5/5 all muscle groups without increased pain in order to return to normal function. Status at initial evaluation: not assessed  Status at last progress note (8/30/17): not reassessed  Current status: good functional UE strength, no c/o deficit, formal testing pending, progressing                            3) Goal: Decrease pain in the neck to 2/10 at worst during ADLs in order to return to more function and improve AROM of neck in all directions.   Status at initial evaluation: pain   Status at last progress note (8/30/17): 2/10 pain with ADL max, goal met                            4) Goal: Pt will improve AROM of neck to 100% of normal range in each direction without increased pain in order to return to safe driving. Status at initial evaluation: AROM neck rotation: 66 degrees left, 64 degrees right, lateral flexion: 30 degrees right, 44 degrees, forward flexion: 45 degrees, neck extension: 39 degrees  Status at last progress note (8/30/17): functional ROM CS in all planes without pain, goal met                            5) Goal: Abolish trigger points in the bilateral C/T and neck musculature in order to decrease pain, improve pain-free AROM of the neck in all directions, and allow pt to push, pull, and lift up to 30 lbs. without increased pain. Status at initial evaluation: multiple trigger points in the bilateral neck and C/T musculature  Status at last progress note (8/30/17): progressing (trigger points in the neck are over 80% resolved)  Current status: not reassessed                            6) Goal: Abolish LBP and radicular symptoms in order to return to normal function.   Status at initial evaluation: right LBP: 8/10 at worst with >r LE pain to the ankle  Status at last progress note (8/30/17): not reassessed  Current status: progressing (LBP 4/10 at worst, no radicular pain) 9/18/17    PLAN  []  Upgrade activities as tolerated     [x]  Continue plan of care  []  Update interventions per flow sheet       []  Discharge due to:_  []  Other:_      Janessa Garza, PT 9/18/2017  7:18 PM    Future Appointments  Date Time Provider Wallace Francis   9/20/2017 4:00 PM [place], [place] YOAN [facility]   9/26/2017 4:30 PM CARL Lee [facility]   9/28/2017 4:30 PM CARL Gil [facility]

## 2017-09-20 ENCOUNTER — HOSPITAL ENCOUNTER (OUTPATIENT)
Dept: PHYSICAL THERAPY | Age: 57
Discharge: HOME | End: 2017-09-20
Payer: OTHER GOVERNMENT

## 2017-09-20 PROCEDURE — 97140 MANUAL THERAPY 1/> REGIONS: CPT

## 2017-09-20 PROCEDURE — 97110 THERAPEUTIC EXERCISES: CPT

## 2017-09-20 NOTE — PROGRESS NOTES
PT DAILY TREATMENT NOTE     Patient Name: Kodi Reyna  Date:2017  : 1960  [x]  Patient  Verified  Payor:  / Plan: [facility]  RETIREES AND DEPENDENTS / Product Type: Rozena Hals /    In time:4:15  Out time:505  Total Treatment Time (min): 50  Visit #: 15 of 16    Treatment Area: Low back pain [M54.5]  Cervicalgia [M54.2]    SUBJECTIVE  Pain Level (0-10 scale): 1/10 neck, 3/10 back  Any medication changes, allergies to medications, adverse drug reactions, diagnosis change, or new procedure performed?: [x] No    [] Yes (see summary sheet for update)  Subjective functional status/changes:   [x] No changes reported  Reports onset of mild left neck pain this morning as she was picking up a basket with her left hand. Mild LB and left trunk pain/lateral scapular region    OBJECTIVE    Modality rationale: decrease pain to improve the patients ability to tolerate positions and ADLs.    Min Type Additional Details    [] Estim:  []Unatt       []IFC  []Premod                        []Other:  []w/ice   []w/heat  Position:  Location:    [] Estim: []Att    []TENS instruct  []NMES                    []Other:  []w/US   []w/ice   []w/heat  Position:  Location:    []  Traction: [] Cervical       []Lumbar                       [] Prone          []Supine                       []Intermittent   []Continuous Lbs:  [] before manual  [] after manual    []  Ultrasound: []Continuous   [] Pulsed                           []1MHz   []3MHz W/cm2:  Location:    []  Iontophoresis with dexamethasone         Location: [] Take home patch   [] In clinic   10 []  Ice     [x]  heat  []  Ice massage  []  Laser   []  Anodyne Position: supine 90-90  Location: applied to neck and back    []  Laser with stim  []  Other:  Position:  Location:    []  Vasopneumatic Device Pressure:       [] lo [] med [] hi   Temperature: [] lo [] med [] hi   [] Skin assessment post-treatment:  []intact []redness- no adverse reaction    []redness - adverse reaction:      min []Eval                  []Re-Eval       25 min Therapeutic Exercise:  [x] See flow sheet :   Rationale: increase ROM, increase strength, improve coordination and increase proprioception to improve the patients ability to normalize ADLs,     min Therapeutic Activity:  []  See flow sheet :         min Neuromuscular Re-education:  []  See flow sheet :       15 min Manual Therapy: functional massage and facet gliding CS, MFR to right lateral intercostal region         min Gait Training:  ___ feet with ___ device on level surfaces with ___ level of assist   Rationale: With   [] TE   [] TA   [] neuro   [] other: Patient Education: [x] Review HEP    [] Progressed/Changed HEP based on:   [] positioning   [] body mechanics   [] transfers   [] heat/ice application    [] other:      Other Objective/Functional Measures:   Mild TTP left lat CS  Minimal residual LB and right lateral intercostal region     Pain Level (0-10 scale) post treatment: 0/10 neck, 2/10 back    ASSESSMENT/Changes in Function:    Pain remaining mild during ADLs, but pain increases as her work intensity/load increases. Patient will continue to benefit from skilled PT services to modify and progress therapeutic interventions, address functional mobility deficits, address ROM deficits, address strength deficits and analyze and address soft tissue restrictions to attain remaining goals. [x]  See Plan of Care  []  See progress note/recertification  []  See Discharge Summary         Progress towards goals / Updated goals:  Short Term Goals: To be accomplished in 4 weeks:                         2) Goal: Pt's LBP will decrease to 4/10 at worst in order to better tolerate all ADLs.   Status at initial evaluation: LBP 8/10 at worst  Status at last progress note (8/30/17): LBP 0-4/10, overall pain reduction, pain with material handling, goal met                            2) Goal: Innominate alignment will remain symmetric and stable in order to decrease pain and improve tolerance for ambulation and ADLs. Status at initial evaluation: right innominate upslip corrected on 8/01/17  Status at last progress note (8/30/17): met (right innominate upslip last corrected on 8/01/17)                            3) Goal: Right LE radicular symptoms will be centralized to the low back in order to demonstrate effectiveness of directional preference exercises and decrease risk of LE dysfunction. Status at initial evaluation: intermittent right LE pain to the ankle  Status at last progress note (8/30/17): occasional right LE pain with prolonged standing, residual mild calf pain with prolonged standing during work, progressing  Current status: Negative for radicular LE symptoms, goal met                            9) Goal: Pt will be independent and compliant with HEP to achieve other goals. Status at initial evaluation: pt is not independent with exercises. Current Status: progressing (pt is about 50% independent with exercises)  Current status: patient compliant with current HEP, goal met      Long Term Goals (To be completed in 8 weeks)                        1) Goal: Increase AROM/PROM of right shoulder to 100% of normal in all directions without increased pain in order to return to normal function. Status at initial evaluation:  Status at last progress note (8/30/17): functional symmetrical shoulder mobility with minimal residual right shoulder pain, goal met                            2) Goal: Increase strength of right UE to 5/5 all muscle groups without increased pain in order to return to normal function.   Status at initial evaluation: not assessed  Status at last progress note (8/30/17): not reassessed  Current status: good functional UE strength, no c/o deficit, formal testing pending, progressing                            3) Goal: Decrease pain in the neck to 2/10 at worst during ADLs in order to return to more function and improve AROM of neck in all directions. Status at initial evaluation: pain   Status at last progress note (8/30/17): 2/10 pain with ADL max, goal met                            4) Goal: Pt will improve AROM of neck to 100% of normal range in each direction without increased pain in order to return to safe driving. Status at initial evaluation: AROM neck rotation: 66 degrees left, 64 degrees right, lateral flexion: 30 degrees right, 44 degrees, forward flexion: 45 degrees, neck extension: 39 degrees  Status at last progress note (8/30/17): functional ROM CS in all planes without pain, goal met                            5) Goal: Abolish trigger points in the bilateral C/T and neck musculature in order to decrease pain, improve pain-free AROM of the neck in all directions, and allow pt to push, pull, and lift up to 30 lbs. without increased pain. 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   Status at initial evaluation: right LBP: 8/10 at worst with >r LE pain to the ankle  Status at last progress note (8/30/17): not reassessed  Current status: progressing (LBP 4/10 at worst, no radicular pain) 9/18/17    PLAN  []  Upgrade activities as tolerated     [x]  Continue plan of care, anticipate d/c to HEP after NV  []  Update interventions per flow sheet       []  Discharge due to:_  []  Other:_      Kyle Carlson PT 9/20/2017  7:18 PM    Future Appointments  Date Time Provider Wallace Francis   9/26/2017 4:30 PM CARL York [facility]   9/28/2017 4:30 PM CARL Phelps [facility]

## 2017-09-26 ENCOUNTER — HOSPITAL ENCOUNTER (OUTPATIENT)
Dept: PHYSICAL THERAPY | Age: 57
Discharge: HOME | End: 2017-09-26
Payer: OTHER GOVERNMENT

## 2017-09-26 PROCEDURE — 97110 THERAPEUTIC EXERCISES: CPT

## 2017-09-26 NOTE — PROGRESS NOTES
[facility] in [address]. [place], [address]  Phone: [phone]      Fax:  [phone]    Discharge Summary      Patient name: Estephania Garces     Start of Care: 17  Referral source: Velia Godwin MD    : 1960  Medical/Treatment Diagnosis: Low back pain [M54.5]  Cervicalgia [M54.2]       Onset Date: over 10 years ago (LBP), early 2017 (neck)  Prior Hospitalization: see medical history    Provider#: [id number]  Comorbidities: OA, HTN  Prior Level of Function: no deficits working in [employer]  Medications: Verified on Patient Summary List  Visits from Start of Care: 16     Missed Visits: 1  Reporting Period : 17 to 17    Short Term Goals: To be accomplished in 4 weeks:                         1) Goal: Pt's LBP will decrease to 4/10 at worst in order to better tolerate all ADLs. Status at initial evaluation: LBP 8/10 at worst  Status at last progress note (17): LBP 0-4/10, overall pain reduction, pain with material handling, goal met  Current status: met (pain 3/10 at worst)                            2) Goal: Innominate alignment will remain symmetric and stable in order to decrease pain and improve tolerance for ambulation and ADLs. Status at initial evaluation: right innominate upslip corrected on 17  Status at last progress note (17): met (right innominate upslip last corrected on 17)  Current status: met                            3) Goal: Right LE radicular symptoms will be centralized to the low back in order to demonstrate effectiveness of directional preference exercises and decrease risk of LE dysfunction.   Status at initial evaluation: intermittent right LE pain to the ankle  Status at last progress note (17): occasional right LE pain with prolonged standing, residual mild calf pain with prolonged standing during work, progressing  Current status: Negative for radicular LE symptoms, goal met                            2) Goal: Pt will be independent and compliant with HEP to achieve other goals. Status at initial evaluation: pt is not independent with exercises. Current Status: progressing (pt is about 50% independent with exercises)  Current status: patient compliant with current HEP, goal met      Long Term Goals (To be completed in 8 weeks)                        1) Goal: Increase AROM/PROM of right shoulder to 100% of normal in all directions without increased pain in order to return to normal function. Status at initial evaluation:  Status at last progress note (8/30/17): functional symmetrical shoulder mobility with minimal residual right shoulder pain, goal met                            2) Goal: Increase strength of right UE to 5/5 all muscle groups without increased pain in order to return to normal function. Status at initial evaluation: not assessed  Status at last progress note (8/30/17): not reassessed  Current status: not met (Strength of UEs grossly 4/5 to 5/5 bilateral UEs without pain)                            3) Goal: Decrease pain in the neck to 2/10 at worst during ADLs in order to return to more function and improve AROM of neck in all directions. Status at initial evaluation: pain   Status at last progress note (8/30/17): 2/10 pain with ADL max, goal met                            4) Goal: Pt will improve AROM of neck to 100% of normal range in each direction without increased pain in order to return to safe driving.   Status at initial evaluation: AROM neck rotation: 66 degrees left, 64 degrees right, lateral flexion: 30 degrees right, 44 degrees, forward flexion: 45 degrees, neck extension: 39 degrees  Status at last progress note (8/30/17): functional ROM CS in all planes without pain, goal met                            5) Goal: Abolish trigger points in the bilateral C/T and neck musculature in order to decrease pain, improve pain-free AROM of the neck in all directions, and allow pt to push, pull, and lift up to 30 lbs. without increased pain. 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 )                            2) Goal: Abolish LBP and radicular symptoms in order to return to normal function. Status at initial evaluation: right LBP: 8/10 at worst with >r LE pain to the ankle  Status at last progress note (8/30/17): not reassessed  Current status: progressing (LBP 3/10 at worst, no radicular pain)     Assessment/ Summary of Care:    Pt reports that pain in the low back only occurs during work duties at [employer] and pain remains mild at worst (3/10 at worst). Pt's innominates remaining aligned and stable. She reports that performing extension bias directional preference exercises helps to relieve LBP. Neck pain has abolished and AROM is WFL in all directions without pain reproduction. LE radicular pain abolished. Pt reports compliance with HEP and she has been issued a printed HEP to help with compliance after discharge from PT.     RECOMMENDATIONS:  [x]Discontinue therapy: [x]Patient has reached or is progressing toward set goals      []Patient is non-compliant or has abdicated      []Due to lack of appreciable progress towards set goals    Delgado Maldonado V, PT 9/26/2017 6:29 PM

## 2017-09-26 NOTE — PROGRESS NOTES
PT DAILY TREATMENT NOTE     Patient Name: Tony Zhu  Date:2017  : 1960  [x]  Patient  Verified  Payor:  / Plan: [facility]  RETIREES AND DEPENDENTS / Product Type:  /    In time:4:35  Out time:5:15  Total Treatment Time (min): 40  Visit #: 16 of 16    Treatment Area: Low back pain [M54.5]  Cervicalgia [M54.2]    SUBJECTIVE  Pain Level (0-10 scale): 0/10 neck, 2/10 back  Any medication changes, allergies to medications, adverse drug reactions, diagnosis change, or new procedure performed?: [x] No    [] Yes (see summary sheet for update)  Subjective functional status/changes:   [] No changes reported  \"Pain 3/10 at worst when I work hard at work. \"    OBJECTIVE    Modality rationale:    Min Type Additional Details    [] Estim:  []Unatt       []IFC  []Premod                        []Other:  []w/ice   []w/heat  Position:  Location:    [] Estim: []Att    []TENS instruct  []NMES                    []Other:  []w/US   []w/ice   []w/heat  Position:  Location:    []  Traction: [] Cervical       []Lumbar                       [] Prone          []Supine                       []Intermittent   []Continuous Lbs:  [] before manual  [] after manual    []  Ultrasound: []Continuous   [] Pulsed                           []1MHz   []3MHz W/cm2:  Location:    []  Iontophoresis with dexamethasone         Location: [] Take home patch   [] In clinic    []  Ice     []  heat  []  Ice massage  []  Laser   []  Anodyne Position:  Location:    []  Laser with stim  []  Other:  Position:  Location:    []  Vasopneumatic Device Pressure:       [] lo [] med [] hi   Temperature: [] lo [] med [] hi   [] Skin assessment post-treatment:  []intact []redness- no adverse reaction    []redness - adverse reaction:      min []Eval                  []Re-Eval       40 min Therapeutic Exercise:  [x] See flow sheet :  Reviewed HEP and discussed progression of HEP after discharge   Rationale: increase ROM, increase strength, improve coordination and increase proprioception to improve the patients ability to normalize ADLs,     min Therapeutic Activity:  []  See flow sheet :         min Neuromuscular Re-education:  []  See flow sheet :        min Manual Therapy:          min Gait Training:  ___ feet with ___ device on level surfaces with ___ level of assist   Rationale: With   [] TE   [] TA   [] neuro   [] other: Patient Education: [x] Review HEP    [] Progressed/Changed HEP based on:   [] positioning   [] body mechanics   [] transfers   [] heat/ice application    [] other:      Other Objective/Functional Measures:   Pt is left hand dominant  Strength shoulder flexors: 4/5 bilaterally  Shoulder extensors: 4/5 right, 5/5 left  Abductors: 4/5 right, 5/5 left  ER: 4/5 bilaterally  IR: 5/5 bilaterally  Elbow flexors: 5/5 bilaterally  Elbow extensors: 4+/5 right, 5/5 left    Pain Level (0-10 scale) post treatment: 0/10    ASSESSMENT/Changes in Function:    Pt reports that pain in the low back only occurs during work duties at [employer] and pain remains mild at worst (3/10 at worst). Pt's innominates remaining aligned and stable. She reports that performing extension bias directional preference exercises helps to relieve LBP. Neck pain has abolished and AROM is WFL in all directions without pain reproduction. LE radicular pain abolished. Pt reports compliance with HEP and she has been issued a printed HEP to help with compliance after discharge from PT.     []  See Plan of Care  []  See progress note/recertification  []  See Discharge Summary         Progress towards goals / Updated goals:  Short Term Goals: To be accomplished in 4 weeks:                         7) Goal: Pt's LBP will decrease to 4/10 at worst in order to better tolerate all ADLs.   Status at initial evaluation: LBP 8/10 at worst  Status at last progress note (8/30/17): LBP 0-4/10, overall pain reduction, pain with material handling, goal met  Current status: met (pain 3/10) 9/26/17                            2) Goal: Innominate alignment will remain symmetric and stable in order to decrease pain and improve tolerance for ambulation and ADLs. Status at initial evaluation: right innominate upslip corrected on 8/01/17  Status at last progress note (8/30/17): met (right innominate upslip last corrected on 8/01/17)  Current status: met 9/26/17                            3) Goal: Right LE radicular symptoms will be centralized to the low back in order to demonstrate effectiveness of directional preference exercises and decrease risk of LE dysfunction. Status at initial evaluation: intermittent right LE pain to the ankle  Status at last progress note (8/30/17): occasional right LE pain with prolonged standing, residual mild calf pain with prolonged standing during work, progressing  Current status: Negative for radicular LE symptoms, goal met                            3) Goal: Pt will be independent and compliant with HEP to achieve other goals. Status at initial evaluation: pt is not independent with exercises. Current Status: progressing (pt is about 50% independent with exercises)  Current status: patient compliant with current HEP, goal met      Long Term Goals (To be completed in 8 weeks)                        1) Goal: Increase AROM/PROM of right shoulder to 100% of normal in all directions without increased pain in order to return to normal function. Status at initial evaluation:  Status at last progress note (8/30/17): functional symmetrical shoulder mobility with minimal residual right shoulder pain, goal met                            2) Goal: Increase strength of right UE to 5/5 all muscle groups without increased pain in order to return to normal function.   Status at initial evaluation: not assessed  Status at last progress note (8/30/17): not reassessed  Current status: not met (Strength of UEs grossly 4/5 to 5/5 bilateral UEs without pain) 9/26/17                            3) Goal: Decrease pain in the neck to 2/10 at worst during ADLs in order to return to more function and improve AROM of neck in all directions. Status at initial evaluation: pain   Status at last progress note (8/30/17): 2/10 pain with ADL max, goal met                            4) Goal: Pt will improve AROM of neck to 100% of normal range in each direction without increased pain in order to return to safe driving. Status at initial evaluation: AROM neck rotation: 66 degrees left, 64 degrees right, lateral flexion: 30 degrees right, 44 degrees, forward flexion: 45 degrees, neck extension: 39 degrees  Status at last progress note (8/30/17): functional ROM CS in all planes without pain, goal met                            5) Goal: Abolish trigger points in the bilateral C/T and neck musculature in order to decrease pain, improve pain-free AROM of the neck in all directions, and allow pt to push, pull, and lift up to 30 lbs. without increased pain. 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 )                            8) Goal: Abolish LBP and radicular symptoms in order to return to normal function.   Status at initial evaluation: right LBP: 8/10 at worst with >r LE pain to the ankle  Status at last progress note (8/30/17): not reassessed  Current status: progressing (LBP 3/10 at worst, no radicular pain) 9/26/17    PLAN  []  Upgrade activities as tolerated     []  Continue plan of care  []  Update interventions per flow sheet       [x]  Discharge due to: completion of program  []  Other:_      Francois Hough PT 9/26/2017  4:40 PM    Future Appointments  Date Time Provider Wallace Francis   9/28/2017 4:30 PM Vlad Rhoades PT [facility]

## 2017-09-28 ENCOUNTER — APPOINTMENT (OUTPATIENT)
Dept: PHYSICAL THERAPY | Age: 57
End: 2017-09-28
Payer: OTHER GOVERNMENT